# Patient Record
Sex: FEMALE | Race: WHITE | NOT HISPANIC OR LATINO | Employment: FULL TIME | URBAN - METROPOLITAN AREA
[De-identification: names, ages, dates, MRNs, and addresses within clinical notes are randomized per-mention and may not be internally consistent; named-entity substitution may affect disease eponyms.]

---

## 2019-01-02 ENCOUNTER — TRANSCRIBE ORDERS (OUTPATIENT)
Dept: ADMINISTRATIVE | Facility: HOSPITAL | Age: 49
End: 2019-01-02

## 2019-01-02 DIAGNOSIS — N93.9 VAGINA BLEEDING: Primary | ICD-10-CM

## 2021-06-09 ENCOUNTER — APPOINTMENT (EMERGENCY)
Dept: RADIOLOGY | Facility: HOSPITAL | Age: 51
End: 2021-06-09
Payer: COMMERCIAL

## 2021-06-09 ENCOUNTER — HOSPITAL ENCOUNTER (EMERGENCY)
Facility: HOSPITAL | Age: 51
Discharge: HOME | End: 2021-06-09
Attending: EMERGENCY MEDICINE | Admitting: EMERGENCY MEDICINE
Payer: COMMERCIAL

## 2021-06-09 VITALS
RESPIRATION RATE: 20 BRPM | WEIGHT: 168 LBS | HEIGHT: 62 IN | BODY MASS INDEX: 30.91 KG/M2 | DIASTOLIC BLOOD PRESSURE: 70 MMHG | HEART RATE: 90 BPM | OXYGEN SATURATION: 97 % | SYSTOLIC BLOOD PRESSURE: 146 MMHG

## 2021-06-09 DIAGNOSIS — M79.641 RIGHT HAND PAIN: Primary | ICD-10-CM
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
## 2021-06-09 PROCEDURE — 73130 X-RAY EXAM OF HAND: CPT

## 2021-06-09 PROCEDURE — 99284 EMERGENCY DEPT VISIT MOD MDM: CPT | Performed by: EMERGENCY MEDICINE

## 2021-06-09 PROCEDURE — 99283 EMERGENCY DEPT VISIT LOW MDM: CPT

## 2021-06-09 RX ORDER — IBUPROFEN 600 MG/1
600 TABLET ORAL ONCE
Status: COMPLETED | OUTPATIENT
Start: 2021-06-09 | End: 2021-06-09

## 2021-06-09 RX ORDER — ACETAMINOPHEN 325 MG/1
650 TABLET ORAL ONCE
Status: COMPLETED | OUTPATIENT
Start: 2021-06-09 | End: 2021-06-09

## 2021-06-09 RX ADMIN — ACETAMINOPHEN 650 MG: 325 TABLET, FILM COATED ORAL at 07:27

## 2021-06-09 RX ADMIN — IBUPROFEN 600 MG: 600 TABLET, FILM COATED ORAL at 07:27

## 2021-06-09 NOTE — DISCHARGE INSTRUCTIONS
Take tylenol, motrin, rest, ice, elevate  Keep hand ace wrapped for support  Follow up with hand surgery for reassessment  Return to the emergency department for the following, but not limited to worsening pain, numbness, tingling, weakness, redness/rash, red streaks, fevers, swelling

## 2021-06-09 NOTE — Clinical Note
Terrell Lebron was seen and treated in our emergency department on 6/9/2021  Diagnosis:     Azalia Damon  may return to work on return date  She may return on this date: 06/14/2021         If you have any questions or concerns, please don't hesitate to call        Terrance Maciel DO    ______________________________           _______________          _______________  Hospital Representative                              Date                                Time

## 2021-06-09 NOTE — ED PROVIDER NOTES
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  She also hit the back of her hand on something that day  Tuesday, her hand was a bit achey, but then today, she complains of worsening pain, constant, throbbing, radiating from the fingers into the hand and sometimes into the forearm, with a bit of swelling in the hand and fingers  Denies any rash, numbness, tingling, weakness  Has not tried any medications at home for relief  None       Past Medical History:   Diagnosis Date    Disease of thyroid gland     Pancreatitis        Past Surgical History:   Procedure Laterality Date    CHOLECYSTECTOMY         History reviewed  No pertinent family history  I have reviewed and agree with the history as documented  E-Cigarette/Vaping     E-Cigarette/Vaping Substances     Social History     Tobacco Use    Smoking status: Never Smoker    Smokeless tobacco: Never Used   Substance Use Topics    Alcohol use: Not Currently    Drug use: Never       Review of Systems   Constitutional: Negative for chills and fever  Skin: Negative for color change, pallor and rash  Neurological: Negative for weakness and numbness  Physical Exam  Physical Exam  Vitals signs and nursing note reviewed  Constitutional:       General: She is not in acute distress  Appearance: Normal appearance  She is not ill-appearing, toxic-appearing or diaphoretic     HENT:      Head: Normocephalic and atraumatic  Mouth/Throat:      Mouth: Mucous membranes are moist    Eyes:      Conjunctiva/sclera: Conjunctivae normal       Pupils: Pupils are equal, round, and reactive to light  Neck:      Musculoskeletal: Neck supple  Cardiovascular:      Rate and Rhythm: Normal rate and regular rhythm  Pulses: Normal pulses  Heart sounds: Normal heart sounds  No murmur  Pulmonary:      Effort: Pulmonary effort is normal  No respiratory distress  Breath sounds: Normal breath sounds  No stridor  No wheezing, rhonchi or rales  Chest:      Chest wall: No tenderness  Abdominal:      General: Bowel sounds are normal  There is no distension  Palpations: Abdomen is soft  Tenderness: There is no abdominal tenderness  There is no guarding or rebound  Musculoskeletal:      Right wrist: Normal  She exhibits normal range of motion, no tenderness, no bony tenderness, no swelling, no effusion, no crepitus, no deformity and no laceration  Right forearm: Normal  She exhibits no tenderness, no bony tenderness, no swelling, no edema, no deformity and no laceration  Right hand: She exhibits tenderness and swelling  She exhibits normal range of motion, no bony tenderness, normal two-point discrimination, normal capillary refill, no deformity and no laceration  Normal sensation noted  Decreased sensation is not present in the ulnar distribution, is not present in the medial distribution and is not present in the radial distribution  Normal strength noted  She exhibits no finger abduction, no thumb/finger opposition and no wrist extension trouble  Hands:       Right lower leg: No edema  Left lower leg: No edema  Skin:     General: Skin is warm and dry  Capillary Refill: Capillary refill takes less than 2 seconds  Findings: No bruising, erythema or rash  Neurological:      General: No focal deficit present        Mental Status: She is alert and oriented to person, place, and time  Mental status is at baseline  Psychiatric:         Mood and Affect: Mood normal          Behavior: Behavior normal          Vital Signs  ED Triage Vitals   Temp Pulse Respirations Blood Pressure SpO2   -- 06/09/21 0714 06/09/21 0714 06/09/21 0714 06/09/21 0714    90 20 146/70 97 %      Temp src Heart Rate Source Patient Position - Orthostatic VS BP Location FiO2 (%)   -- -- -- -- --             Pain Score       06/09/21 0727       6           Vitals:    06/09/21 0714   BP: 146/70   Pulse: 90         Visual Acuity      ED Medications  Medications   ibuprofen (MOTRIN) tablet 600 mg (600 mg Oral Given 6/9/21 0727)   acetaminophen (TYLENOL) tablet 650 mg (650 mg Oral Given 6/9/21 0727)       Diagnostic Studies  Results Reviewed     None                 XR hand 3+ views RIGHT   ED Interpretation by Xander Augustin DO (06/09 8434)   No acute fractures as interpreted by me independently                  Procedures  Procedures         ED Course                                           MDM  Number of Diagnoses or Management Options  Overuse injury:   Right hand pain:   Diagnosis management comments: Assessment and Plan:   46year old F presenting with right hand and fingers pain consistent with overuse injury  Exam is benign  No erythema/rash to suggest infectious etiology  Patient is neurovascularly intact  Will get XR to rule out fracture in setting of trauma  Rest, ice, elevate, tylenol, motrin and ace wrap for support  Follow up hand surgery for reassessment  Disposition  Final diagnoses:   Right hand pain   Overuse injury     Time reflects when diagnosis was documented in both MDM as applicable and the Disposition within this note     Time User Action Codes Description Comment    6/9/2021  7:24 AM Goran Page Add [P82 947] Right hand pain     6/9/2021  7:24 AM Goran Page Add [X50  3XXA] Overuse injury       ED Disposition     ED Disposition Condition Date/Time Comment Discharge Stable Wed Jun 9, 2021  7:24 AM Katheryn Gallus Bickerstaff discharge to home/self care  Follow-up Information     Follow up With Specialties Details Why Contact Info Additional Andrés Adrian [address] Emergency Department Emergency Medicine Go to  As needed, If symptoms worsen, for re-evaluation [address] [place],[address] [address]  [address] [facility] Emergency Department, [facility] 18, Luige Jayesh 10    Oc Vazquez MD Orthopedic Surgery, Hand Surgery Schedule an appointment as soon as possible for a visit in 1 week for re-evaluation Celia Castro [address] [place] [address]  [phone]             There are no discharge medications for this patient  No discharge procedures on file      PDMP Review     None          ED Provider  Electronically Signed by           Goyo Warren DO  06/09/21 0800

## 2022-07-29 ENCOUNTER — APPOINTMENT (EMERGENCY)
Dept: RADIOLOGY | Facility: HOSPITAL | Age: 52
End: 2022-07-29
Payer: COMMERCIAL

## 2022-07-29 ENCOUNTER — HOSPITAL ENCOUNTER (EMERGENCY)
Facility: HOSPITAL | Age: 52
Discharge: HOME | End: 2022-07-29
Attending: EMERGENCY MEDICINE
Payer: COMMERCIAL

## 2022-07-29 VITALS
TEMPERATURE: 98.2 F | HEIGHT: 62 IN | DIASTOLIC BLOOD PRESSURE: 77 MMHG | WEIGHT: 155 LBS | HEART RATE: 94 BPM | RESPIRATION RATE: 16 BRPM | SYSTOLIC BLOOD PRESSURE: 162 MMHG | BODY MASS INDEX: 28.52 KG/M2 | OXYGEN SATURATION: 100 %

## 2022-07-29 DIAGNOSIS — S60.229A CONTUSION OF HAND: Primary | ICD-10-CM

## 2022-07-29 PROCEDURE — 99284 EMERGENCY DEPT VISIT MOD MDM: CPT | Performed by: EMERGENCY MEDICINE

## 2022-07-29 PROCEDURE — 73130 X-RAY EXAM OF HAND: CPT

## 2022-07-29 PROCEDURE — 99283 EMERGENCY DEPT VISIT LOW MDM: CPT

## 2022-07-29 RX ORDER — LEVOTHYROXINE SODIUM 0.12 MG/1
137.5 TABLET ORAL DAILY
COMMUNITY
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
## 2023-02-05 ENCOUNTER — HOSPITAL ENCOUNTER (OUTPATIENT)
Facility: HOSPITAL | Age: 53
Setting detail: OBSERVATION
Discharge: HOME | End: 2023-02-05
Attending: EMERGENCY MEDICINE | Admitting: HOSPITALIST

## 2023-02-05 ENCOUNTER — APPOINTMENT (EMERGENCY)
Dept: RADIOLOGY | Facility: HOSPITAL | Age: 53
End: 2023-02-05

## 2023-02-05 VITALS
TEMPERATURE: 97.6 F | OXYGEN SATURATION: 98 % | HEIGHT: 62 IN | BODY MASS INDEX: 28.44 KG/M2 | SYSTOLIC BLOOD PRESSURE: 140 MMHG | HEART RATE: 86 BPM | RESPIRATION RATE: 20 BRPM | WEIGHT: 154.54 LBS | DIASTOLIC BLOOD PRESSURE: 78 MMHG

## 2023-02-05 DIAGNOSIS — E87.6 HYPOKALEMIA: ICD-10-CM

## 2023-02-05 DIAGNOSIS — R55 SYNCOPE: Primary | ICD-10-CM

## 2023-02-05 DIAGNOSIS — R94.31 PROLONGED Q-T INTERVAL ON ECG: ICD-10-CM

## 2023-02-05 PROBLEM — E03.9 HYPOTHYROIDISM: Status: ACTIVE | Noted: 2023-02-05

## 2023-02-05 LAB
2HR DELTA HS TROPONIN: -1 NG/L
4HR DELTA HS TROPONIN: -1 NG/L
ALBUMIN SERPL BCP-MCNC: 4.1 G/DL (ref 3.5–5)
ALP SERPL-CCNC: 87 U/L (ref 46–116)
ALT SERPL W P-5'-P-CCNC: 38 U/L (ref 12–78)
ANION GAP SERPL CALCULATED.3IONS-SCNC: 16 MMOL/L (ref 4–13)
ANION GAP SERPL CALCULATED.3IONS-SCNC: 8 MMOL/L (ref 4–13)
AST SERPL W P-5'-P-CCNC: 60 U/L (ref 5–45)
ATRIAL RATE: 98 BPM
BASOPHILS # BLD AUTO: 0.05 THOUSANDS/ÂΜL (ref 0–0.1)
BASOPHILS NFR BLD AUTO: 0 % (ref 0–1)
BILIRUB SERPL-MCNC: 0.9 MG/DL (ref 0.2–1)
BUN SERPL-MCNC: 10 MG/DL (ref 5–25)
BUN SERPL-MCNC: 12 MG/DL (ref 5–25)
CALCIUM SERPL-MCNC: 8.8 MG/DL (ref 8.3–10.1)
CALCIUM SERPL-MCNC: 9.8 MG/DL (ref 8.3–10.1)
CARDIAC TROPONIN I PNL SERPL HS: 5 NG/L
CARDIAC TROPONIN I PNL SERPL HS: 5 NG/L
CARDIAC TROPONIN I PNL SERPL HS: 6 NG/L
CHLORIDE SERPL-SCNC: 103 MMOL/L (ref 96–108)
CHLORIDE SERPL-SCNC: 98 MMOL/L (ref 96–108)
CO2 SERPL-SCNC: 23 MMOL/L (ref 21–32)
CO2 SERPL-SCNC: 26 MMOL/L (ref 21–32)
CREAT SERPL-MCNC: 0.62 MG/DL (ref 0.6–1.3)
CREAT SERPL-MCNC: 0.81 MG/DL (ref 0.6–1.3)
EOSINOPHIL # BLD AUTO: 0.01 THOUSAND/ÂΜL (ref 0–0.61)
EOSINOPHIL NFR BLD AUTO: 0 % (ref 0–6)
ERYTHROCYTE [DISTWIDTH] IN BLOOD BY AUTOMATED COUNT: 12.5 % (ref 11.6–15.1)
GFR SERPL CREATININE-BSD FRML MDRD: 102 ML/MIN/1.73SQ M
GFR SERPL CREATININE-BSD FRML MDRD: 82 ML/MIN/1.73SQ M
GLUCOSE SERPL-MCNC: 117 MG/DL (ref 65–140)
GLUCOSE SERPL-MCNC: 122 MG/DL (ref 65–140)
HCT VFR BLD AUTO: 43.4 % (ref 34.8–46.1)
HGB BLD-MCNC: 14.8 G/DL (ref 11.5–15.4)
IMM GRANULOCYTES # BLD AUTO: 0.05 THOUSAND/UL (ref 0–0.2)
IMM GRANULOCYTES NFR BLD AUTO: 0 % (ref 0–2)
LYMPHOCYTES # BLD AUTO: 1.85 THOUSANDS/ÂΜL (ref 0.6–4.47)
LYMPHOCYTES NFR BLD AUTO: 16 % (ref 14–44)
MAGNESIUM SERPL-MCNC: 1.7 MG/DL (ref 1.6–2.6)
MCH RBC QN AUTO: 29.3 PG (ref 26.8–34.3)
MCHC RBC AUTO-ENTMCNC: 34.1 G/DL (ref 31.4–37.4)
MCV RBC AUTO: 86 FL (ref 82–98)
MONOCYTES # BLD AUTO: 0.74 THOUSAND/ÂΜL (ref 0.17–1.22)
MONOCYTES NFR BLD AUTO: 7 % (ref 4–12)
NEUTROPHILS # BLD AUTO: 8.59 THOUSANDS/ÂΜL (ref 1.85–7.62)
NEUTS SEG NFR BLD AUTO: 77 % (ref 43–75)
NRBC BLD AUTO-RTO: 0 /100 WBCS
P AXIS: 59 DEGREES
PLATELET # BLD AUTO: 324 THOUSANDS/UL (ref 149–390)
PMV BLD AUTO: 9.8 FL (ref 8.9–12.7)
POTASSIUM SERPL-SCNC: 3.2 MMOL/L (ref 3.5–5.3)
POTASSIUM SERPL-SCNC: 4.1 MMOL/L (ref 3.5–5.3)
POTASSIUM SERPL-SCNC: 5.6 MMOL/L (ref 3.5–5.3)
PR INTERVAL: 158 MS
PROT SERPL-MCNC: 8.7 G/DL (ref 6.4–8.4)
QRS AXIS: 17 DEGREES
QRSD INTERVAL: 76 MS
QT INTERVAL: 412 MS
QTC INTERVAL: 525 MS
RBC # BLD AUTO: 5.05 MILLION/UL (ref 3.81–5.12)
SODIUM SERPL-SCNC: 137 MMOL/L (ref 135–147)
SODIUM SERPL-SCNC: 137 MMOL/L (ref 135–147)
T WAVE AXIS: 60 DEGREES
TSH SERPL DL<=0.05 MIU/L-ACNC: 1.42 UIU/ML (ref 0.45–4.5)
VENTRICULAR RATE: 98 BPM
WBC # BLD AUTO: 11.29 THOUSAND/UL (ref 4.31–10.16)

## 2023-02-05 RX ORDER — MAGNESIUM SULFATE HEPTAHYDRATE 40 MG/ML
2 INJECTION, SOLUTION INTRAVENOUS ONCE
Status: COMPLETED | OUTPATIENT
Start: 2023-02-05 | End: 2023-02-05

## 2023-02-05 RX ORDER — ONDANSETRON 4 MG/1
4 TABLET, ORALLY DISINTEGRATING ORAL ONCE
Status: COMPLETED | OUTPATIENT
Start: 2023-02-05 | End: 2023-02-05

## 2023-02-05 RX ORDER — POTASSIUM CHLORIDE 14.9 MG/ML
20 INJECTION INTRAVENOUS ONCE
Status: COMPLETED | OUTPATIENT
Start: 2023-02-05 | End: 2023-02-05

## 2023-02-05 RX ORDER — ONDANSETRON 2 MG/ML
4 INJECTION INTRAMUSCULAR; INTRAVENOUS EVERY 6 HOURS PRN
Status: DISCONTINUED | OUTPATIENT
Start: 2023-02-05 | End: 2023-02-05

## 2023-02-05 RX ORDER — POTASSIUM CHLORIDE 20 MEQ/1
40 TABLET, EXTENDED RELEASE ORAL ONCE
Status: COMPLETED | OUTPATIENT
Start: 2023-02-05 | End: 2023-02-05

## 2023-02-05 RX ORDER — SODIUM CHLORIDE 9 MG/ML
100 INJECTION, SOLUTION INTRAVENOUS CONTINUOUS
Status: DISCONTINUED | OUTPATIENT
Start: 2023-02-05 | End: 2023-02-05 | Stop reason: HOSPADM

## 2023-02-05 RX ORDER — ACETAMINOPHEN 325 MG/1
650 TABLET ORAL EVERY 6 HOURS PRN
Status: DISCONTINUED | OUTPATIENT
Start: 2023-02-05 | End: 2023-02-05 | Stop reason: HOSPADM

## 2023-02-05 RX ADMIN — LEVOTHYROXINE SODIUM 137.5 MCG: 25 TABLET ORAL at 06:28

## 2023-02-05 RX ADMIN — POTASSIUM CHLORIDE 20 MEQ: 14.9 INJECTION, SOLUTION INTRAVENOUS at 01:54

## 2023-02-05 RX ADMIN — SODIUM CHLORIDE 1000 ML: 0.9 INJECTION, SOLUTION INTRAVENOUS at 00:58

## 2023-02-05 RX ADMIN — MAGNESIUM SULFATE HEPTAHYDRATE 2 G: 2 INJECTION, SOLUTION INTRAVENOUS at 01:54

## 2023-02-05 RX ADMIN — POTASSIUM CHLORIDE 40 MEQ: 1500 TABLET, EXTENDED RELEASE ORAL at 01:54

## 2023-02-05 RX ADMIN — SODIUM CHLORIDE 100 ML/HR: 0.9 INJECTION, SOLUTION INTRAVENOUS at 03:17

## 2023-02-05 RX ADMIN — ONDANSETRON 4 MG: 4 TABLET, ORALLY DISINTEGRATING ORAL at 00:07
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
## 2023-02-05 NOTE — ASSESSMENT & PLAN NOTE
· Potassium 3 2 on admission  · Received repletion while in the ER  · Continue to monitor and replete as needed
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
## 2023-02-05 NOTE — NURSING NOTE
Received pt from ER into ER Holding (IR room 1) at approx 0245  IV Potassium and IV Magnesium now completed and NSS infusing as ordered  Pt denies discomfort, states she feels "normal now"; ambulated to BR to void  VSS Dozing for short intervals  NSR on the monitor
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
## 2023-02-05 NOTE — DISCHARGE SUMMARY
[place]  Discharge- Jim Marrero 1970, 45 y o  female MRN: [id number]  Unit/Bed#: EB82A Encounter: [id number]  Primary Care Provider: Reyna Nunez MD   Date and time admitted to hospital: 2/5/2023 12:31 AM    Hypokalemia  Assessment & Plan  · Potassium 3 2 on admission  · Received repletion while in the ER  · resolved    Hypothyroidism  Assessment & Plan  · Continue levothyroxine    * Syncope  Assessment & Plan  · Patient had a syncopal event when attempting to get out of hot tub, per day patient was unconscious for possibly a minute  After regaining consciousness continued to feel lightheaded  Prior to syncopal event patient had had a couple of [address] ice teas and minimal food for dinner  · EKG: NSR, 98 bpm  525 QTc  · No prior cardiac history  · Likely related to alcohol, not eating, hot tub  Repeat EKG shows QTc reduced to 470 range with correction of hypokalemia  Hospital Course:     Jim Marrero is a 45 y o  female patient who originally presented to the hospital on   Admission Orders (From admission, onward)     Ordered        02/05/23 0136  Place in Observation  Once                     due to syncope  This was in the setting of patient not eating, drinking alcohol, and hot tub usage  Patient was noted to have prolonged QTc on initial ekg  Patient endorsed a syncopal episode as a child as well  However, her QTc markedly improved with treatment of hypokalemia and hypomagnesemia  Therefore less suspicion for any long qt syndrome at this time  Patient given instructions to follow up with cardiology as an outpatient if she desires  Please see above list of diagnoses and related plan for additional information       Physical Exam:    GEN: No acute distress, comfortable  HEEENT: No JVD, PERRLA, no scleral icterus  RESP: Lungs clear to auscultation bilaterally  CV: RRR, +s1/s2   ABD: SOFT NON TENDER, POSITIVE BOWEL SOUNDS, NO DISTENTION  PSYCH: CALM  NEURO: Mentation baseline, NO FOCAL DEFICITS  SKIN: NO RASH  EXTREM: NO EDEMA    CONSULTING PROVIDERS   IP CONSULT TO CARDIOLOGY  IP CONSULT TO CASE MANAGEMENT    PROCEDURES PERFORMED  * No surgery found *    RADIOLOGY RESULTS  XR chest portable    Result Date: 2/5/2023  Narrative: CHEST INDICATION:   Acute Resp Failure  COMPARISON:  8/20/2013 EXAM PERFORMED/VIEWS:  XR CHEST PORTABLE Single view FINDINGS: Cardiomediastinal silhouette appears unremarkable  The lungs are clear  No pneumothorax or pleural effusion  Osseous structures appear within normal limits for patient age  Impression: No acute cardiopulmonary disease  Findings are stable Workstation performed: [id number]       LABS  Results from last 7 days   Lab Units 02/05/23  0058   WBC Thousand/uL 11 29*   HEMOGLOBIN g/dL 14 8   HEMATOCRIT % 43 4   MCV fL 86   PLATELETS Thousands/uL 324     Results from last 7 days   Lab Units 02/05/23  1433 02/05/23  0807 02/05/23  0058   SODIUM mmol/L 137  --  137   POTASSIUM mmol/L 4 1 5 6* 3 2*   CHLORIDE mmol/L 103  --  98   CO2 mmol/L 26  --  23   BUN mg/dL 10  --  12   CREATININE mg/dL 0 62  --  0 81   CALCIUM mg/dL 8 8  --  9 8   ALBUMIN g/dL  --   --  4 1   TOTAL BILIRUBIN mg/dL  --   --  0 90   ALK PHOS U/L  --   --  87   ALT U/L  --   --  38   AST U/L  --   --  60*   EGFR ml/min/1 73sq m 102  --  82   GLUCOSE RANDOM mg/dL 117  --  122     Results from last 7 days   Lab Units 02/05/23  0807 02/05/23  0328 02/05/23  0058   HS TNI 0HR ng/L  --   --  6   HS TNI 2HR ng/L 5  --   --    HS TNI 4HR ng/L  --  5  --                       Results from last 7 days   Lab Units 02/05/23  0058   TSH 3RD GENERATON uIU/mL 1 417               Cultures:         Invalid input(s): URIBILINOGEN                Condition at Discharge:  good      Discharge instructions/Information to patient and family:   See after visit summary for information provided to patient and family        Provisions for Follow-Up Care:  See after visit summary for information related to follow-up care and any pertinent home health orders  Disposition:   HOME     Discharge Statement:  I spent 32 minutes discharging the patient  This time was spent on the day of discharge  I had direct contact with the patient on the day of discharge  Greater than 50% of the total time was spent examining patient, answering all patient questions, arranging and discussing plan of care with patient as well as directly providing post-discharge instructions  Additional time then spent on discharge activities  SISTER UPDATED    Discharge Medications:  See after visit summary for reconciled discharge medications provided to patient and family        ** Please Note: This note has been constructed using a voice recognition system **

## 2023-02-05 NOTE — Clinical Note
Case was discussed with EDUARDO and the patient's admission status was agreed to be Admission Status: observation status to the service of Dr Marilee May

## 2023-02-05 NOTE — ED PROVIDER NOTES
History  Chief Complaint   Patient presents with   • Syncope     59-year-old female with past medical history of hypothyroidism on levothyroxine presents for evaluation of syncopal event this evening, states that she was on a first-aid, had a couple of [address] ice teas and had very little amount for dinner was sitting in the hot tub and when she was getting out had a syncopal event where her date had to catch her, she was out for a few minutes and when she came to Feeling like she was about to pass out again  No chest pain or shortness of breath, no similar symptoms in the past   Currently feels nauseous but no chest pain or shortness of breath currently feels nauseous and had a vomiting episode in the ambulance  History of cardiac disease, cardiac arrhythmias, no history of diabetes, no history of DVT or PE          Prior to Admission Medications   Prescriptions Last Dose Informant Patient Reported? Taking?   levothyroxine 125 mcg tablet   Yes No   Sig: Take 137 5 mcg by mouth daily      Facility-Administered Medications: None       Past Medical History:   Diagnosis Date   • Disease of thyroid gland    • Hypothyroid    • Pancreatitis        Past Surgical History:   Procedure Laterality Date   •  SECTION     • CHOLECYSTECTOMY     • ECTOPIC PREGNANCY SURGERY         History reviewed  No pertinent family history  I have reviewed and agree with the history as documented  E-Cigarette/Vaping   • E-Cigarette Use Never User      E-Cigarette/Vaping Substances   • Nicotine No    • THC No    • CBD No    • Flavoring No    • Other No    • Unknown No      Social History     Tobacco Use   • Smoking status: Never   • Smokeless tobacco: Never   Vaping Use   • Vaping Use: Never used   Substance Use Topics   • Alcohol use: Yes     Comment: social   • Drug use: Yes     Types: Marijuana       Review of Systems   Constitutional: Negative for appetite change and fever  HENT: Negative for rhinorrhea and sore throat  Eyes: Negative for photophobia and visual disturbance  Respiratory: Negative for cough, chest tightness and wheezing  Cardiovascular: Negative for chest pain, palpitations and leg swelling  Gastrointestinal: Positive for vomiting  Negative for abdominal distention, abdominal pain, blood in stool, constipation and diarrhea  Genitourinary: Negative for dysuria, flank pain, frequency, hematuria and urgency  Musculoskeletal: Negative for back pain  Skin: Negative for rash  Neurological: Positive for syncope and light-headedness  Negative for dizziness, weakness and headaches  All other systems reviewed and are negative  Physical Exam  Physical Exam  Vitals and nursing note reviewed  Constitutional:       Appearance: She is well-developed  HENT:      Head: Normocephalic and atraumatic  Eyes:      Pupils: Pupils are equal, round, and reactive to light  Cardiovascular:      Rate and Rhythm: Normal rate and regular rhythm  Heart sounds: No murmur heard  No friction rub  No gallop  Pulmonary:      Effort: Pulmonary effort is normal       Breath sounds: No wheezing or rales  Chest:      Chest wall: No tenderness  Abdominal:      General: There is no distension  Palpations: Abdomen is soft  There is no mass  Tenderness: There is no guarding or rebound  Musculoskeletal:      Cervical back: Normal range of motion and neck supple  Skin:     General: Skin is warm and dry  Neurological:      Mental Status: She is alert and oriented to person, place, and time           Vital Signs  ED Triage Vitals [02/05/23 0004]   Temperature Pulse Respirations Blood Pressure SpO2   97 7 °F (36 5 °C) 102 18 112/69 98 %      Temp Source Heart Rate Source Patient Position - Orthostatic VS BP Location FiO2 (%)   Oral Monitor Sitting Left arm --      Pain Score       No Pain           Vitals:    02/05/23 0004   BP: 112/69   Pulse: 102   Patient Position - Orthostatic VS: Sitting Visual Acuity      ED Medications  Medications   sodium chloride 0 9 % bolus 1,000 mL (1,000 mL Intravenous New Bag 2/5/23 0058)   potassium chloride (K-DUR,KLOR-CON) CR tablet 40 mEq (has no administration in time range)   potassium chloride 20 mEq IVPB (premix) (has no administration in time range)   magnesium sulfate 2 g/50 mL IVPB (premix) 2 g (has no administration in time range)   ondansetron (ZOFRAN-ODT) dispersible tablet 4 mg (4 mg Oral Given 2/5/23 0007)       Diagnostic Studies  Results Reviewed     Procedure Component Value Units Date/Time    HS Troponin 0hr (reflex protocol) [[id number]]  (Normal) Collected: 02/05/23 0058    Lab Status: Final result Specimen: Blood from Arm, Right Updated: 02/05/23 0131     hs TnI 0hr 6 ng/L     HS Troponin I 2hr [[id number]]     Lab Status: No result Specimen: Blood     Comprehensive metabolic panel [[id number]]  (Abnormal) Collected: 02/05/23 0058    Lab Status: Final result Specimen: Blood from Arm, Right Updated: 02/05/23 0125     Sodium 137 mmol/L      Potassium 3 2 mmol/L      Chloride 98 mmol/L      CO2 23 mmol/L      ANION GAP 16 mmol/L      BUN 12 mg/dL      Creatinine 0 81 mg/dL      Glucose 122 mg/dL      Calcium 9 8 mg/dL      AST 60 U/L      ALT 38 U/L      Alkaline Phosphatase 87 U/L      Total Protein 8 7 g/dL      Albumin 4 1 g/dL      Total Bilirubin 0 90 mg/dL      eGFR 82 ml/min/1 73sq m     Narrative:      [place]ana guidelines for Chronic Kidney Disease (CKD):   •  Stage 1 with normal or high GFR (GFR > 90 mL/min/1 73 square meters)  •  Stage 2 Mild CKD (GFR = 60-89 mL/min/1 73 square meters)  •  Stage 3A Moderate CKD (GFR = 45-59 mL/min/1 73 square meters)  •  Stage 3B Moderate CKD (GFR = 30-44 mL/min/1 73 square meters)  •  Stage 4 Severe CKD (GFR = 15-29 mL/min/1 73 square meters)  •  Stage 5 End Stage CKD (GFR <15 mL/min/1 73 square meters)  Note: GFR calculation is accurate only with a steady state creatinine Magnesium [[id number]]  (Normal) Collected: 02/05/23 0058    Lab Status: Final result Specimen: Blood from Arm, Right Updated: 02/05/23 0125     Magnesium 1 7 mg/dL     CBC and differential [[id number]]  (Abnormal) Collected: 02/05/23 0058    Lab Status: Final result Specimen: Blood from Arm, Right Updated: 02/05/23 0106     WBC 11 29 Thousand/uL      RBC 5 05 Million/uL      Hemoglobin 14 8 g/dL      Hematocrit 43 4 %      MCV 86 fL      MCH 29 3 pg      MCHC 34 1 g/dL      RDW 12 5 %      MPV 9 8 fL      Platelets 723 Thousands/uL      nRBC 0 /100 WBCs      Neutrophils Relative 77 %      Immat GRANS % 0 %      Lymphocytes Relative 16 %      Monocytes Relative 7 %      Eosinophils Relative 0 %      Basophils Relative 0 %      Neutrophils Absolute 8 59 Thousands/µL      Immature Grans Absolute 0 05 Thousand/uL      Lymphocytes Absolute 1 85 Thousands/µL      Monocytes Absolute 0 74 Thousand/µL      Eosinophils Absolute 0 01 Thousand/µL      Basophils Absolute 0 05 Thousands/µL                  XR chest portable   ED Interpretation by Nancy Neal DO (02/05 0103)   This study was ordered and independently reviewed by me    No acute findings noted                    Procedures  CriticalCare Time  Performed by: Nancy Neal DO  Authorized by: Nancy Neal DO     Critical care provider statement:     Critical care time (minutes):  32    Critical care time was exclusive of:  Separately billable procedures and treating other patients and teaching time    Critical care was necessary to treat or prevent imminent or life-threatening deterioration of the following conditions: Syncope, Hypokalemia, Prolonged QTC      Critical care was time spent personally by me on the following activities:  Blood draw for specimens, obtaining history from patient or surrogate, development of treatment plan with patient or surrogate, discussions with primary provider, evaluation of patient's response to treatment, ordering and performing treatments and interventions, ordering and review of laboratory studies, ordering and review of radiographic studies and re-evaluation of patient's condition    I assumed direction of critical care for this patient from another provider in my specialty: no               ED Course  ED Course as of 02/05/23 0137   [facility] Feb 05, 2023   0039 Procedure Note: EKG  Date/Time: 02/05/23 12:39 AM   Performed by: Eddie Gatica  Authorized by: Eddie Gatica  Indications / Diagnosis: Syncope/Arrythmia  ECG reviewed by me, the ED Provider: yes   The EKG demonstrates:  Rhythm: normal sinus  Intervals: QTC prolonged, 525  Axis: normal axis  QRS/Blocks:normal QRS  ST Changes: No acute ST Changes, no STD/YOJANA        6763 Patient with prolonged QT on initial EKG, in setting of syncope concerning for cardiac cause of her syncopal episode   0128 And with hypokalemia and borderline hypomagnesemia in setting of prolonged QTC and syncope high risk for arrhythmia , case discussed with hospitalist, will admit for observation , telemetry monitoring                               SBIRT 20yo+    Flowsheet Row Most Recent Value   SBIRT (23 yo +)    In order to provide better care to our patients, we are screening all of our patients for alcohol and drug use  Would it be okay to ask you these screening questions? Yes Filed at: 02/05/2023 0009   Initial Alcohol Screen: US AUDIT-C     1  How often do you have a drink containing alcohol? 1 Filed at: 02/05/2023 0009   2  How many drinks containing alcohol do you have on a typical day you are drinking? 0 Filed at: 02/05/2023 0009   3b  FEMALE Any Age, or MALE 65+: How often do you have 4 or more drinks on one occassion? 0 Filed at: 02/05/2023 0009   Audit-C Score 1 Filed at: 02/05/2023 0009   PHILLIP: How many times in the past year have you    Used an illegal drug or used a prescription medication for non-medical reasons?  Never Filed at: 02/05/2023 0009                    Medical Decision Making  61-year-old female with syncopal event followed by a period of lightheadedness, similar history in the past   Differential diagnosis includes neurocardiogenic syncope, dehydration, arrhythmia, PE less likely as patient does not have any shortness of breath or chest pain, satting 98% on room air no DVT or PE risk factors  Obtain lab work and EKG will reevaluate after symptomatic    Previous records available in epic reviewed including previous office visits with PCP, no current medication changes    Hypokalemia: acute illness or injury  Prolonged Q-T interval on ECG: acute illness or injury  Syncope: acute illness or injury  Amount and/or Complexity of Data Reviewed  Labs: ordered  Radiology: ordered and independent interpretation performed  Risk  Prescription drug management  Decision regarding hospitalization  Disposition  Final diagnoses:   Syncope   Prolonged Q-T interval on ECG   Hypokalemia     Time reflects when diagnosis was documented in both MDM as applicable and the Disposition within this note     Time User Action Codes Description Comment    2/5/2023  1:17 AM Héctor Hickey [R55] Syncope     2/5/2023  1:17 AM Héctor Hickey [R94 31] Prolonged Q-T interval on ECG     2/5/2023  1:27 AM Héctor Hickey [E87 6] Hypokalemia       ED Disposition     ED Disposition   Admit    Condition   Stable    Date/Time   Sun Feb 5, 2023  1:35 AM    Comment   Case was discussed with EDUARDO and the patient's admission status was agreed to be Admission Status: observation status to the service of Dr Lisa Salvage   Follow-up Information    None         Patient's Medications   Discharge Prescriptions    No medications on file       No discharge procedures on file      PDMP Review     None          ED Provider  Electronically Signed by           Leodan Coulter DO  02/05/23 0085

## 2023-02-05 NOTE — H&P
[facility]  1970, 45 y o  female MRN: [id number]  Unit/Bed#: Lynnette Beltran Encounter: [id number]  Primary Care Provider: Pooja Madera MD   Date and time admitted to hospital: 2/5/2023 12:31 AM    * Syncope  Assessment & Plan  · Patient had a syncopal event when attempting to get out of hot tub, per day patient was unconscious for possibly a minute  After regaining consciousness continued to feel lightheaded  Prior to syncopal event patient had had a couple of [address] ice teas and minimal food for dinner  · EKG: NSR, 98 bpm  525 QTc  · No prior cardiac history  · Orthostatic Bps  · Telemetry  · Defer to cardiology if inpatient echo is warranted  · Cardiology consult    Hypokalemia  Assessment & Plan  · Potassium 3 2 on admission  · Received repletion while in the ER  · Continue to monitor and replete as needed    Hypothyroidism  Assessment & Plan  · Continue levothyroxine    VTE Pharmacologic Prophylaxis: VTE Score: 2 Low Risk (Score 0-2) - Encourage Ambulation  Code Status: Level 1 - Full Code   Discussion with family: Patient declined call to   Anticipated Length of Stay: Patient will be admitted on an observation basis with an anticipated length of stay of less than 2 midnights secondary to syncope  Total Time for Visit, including Counseling / Coordination of Care: 60 minutes Greater than 50% of this total time spent on direct patient counseling and coordination of care  Chief Complaint: passed out     History of Present Illness:  Gabriel Solis is a 45 y o  female with a PMH of hypothyroidism who presents to the ER post syncopal event  Per patient she was of her normal health throughout the day  She felt nervous/stressed about work and an upcoming date that she had night of 2/4  Due to this she had very little to eat throughout the day    Patient went on the date and had a couple of [address] ice teas at [employer] without any food intake  Her and her date then went to his house to get into the hot tub  They were in the hot tub for approximately 20 minutes  After exiting the hot tub patient had a sudden syncopal event  Denies having lightheadedness, dizziness or tunnel vision prior  Patient's state reports she was unconscious for approximately a minute  After regaining consciousness felt lightheaded and nauseous  Due to continued symptoms EMS was called  EKG with elevated QTc  Patient denies prior cardiac history  Reports having some syncopal episodes as a teenager but not during her adult life  Continues to feel nauseous but patient is attributing this to alcohol intake  Review of Systems:  Review of Systems   Constitutional: Negative for fatigue and fever  HENT: Negative for sore throat  Respiratory: Negative for cough, chest tightness and shortness of breath  Cardiovascular: Negative for chest pain  Gastrointestinal: Positive for nausea  Negative for abdominal distention, abdominal pain, diarrhea and vomiting  Genitourinary: Negative for difficulty urinating  Musculoskeletal: Negative for arthralgias  Neurological: Positive for syncope and light-headedness  Negative for weakness and headaches  Psychiatric/Behavioral: Negative for agitation and behavioral problems  All other systems reviewed and are negative  Past Medical and Surgical History:   Past Medical History:   Diagnosis Date   • Disease of thyroid gland    • Hypothyroid    • Pancreatitis        Past Surgical History:   Procedure Laterality Date   •  SECTION     • CHOLECYSTECTOMY     • ECTOPIC PREGNANCY SURGERY         Meds/Allergies:  Prior to Admission medications    Medication Sig Start Date End Date Taking? Authorizing Provider   levothyroxine 125 mcg tablet Take 137 5 mcg by mouth daily    Historical Provider, MD     I have reviewed home medications with patient personally      Allergies: No Known Allergies    Social History:  Marital Status:    Occupation: Unknown  Patient Pre-hospital Living Situation: Home  Patient Pre-hospital Level of Mobility: walks  Patient Pre-hospital Diet Restrictions: Regular  Substance Use History:   Social History     Substance and Sexual Activity   Alcohol Use Yes    Comment: social     Social History     Tobacco Use   Smoking Status Never   Smokeless Tobacco Never     Social History     Substance and Sexual Activity   Drug Use Yes   • Types: Marijuana       Family History:  History reviewed  No pertinent family history  Physical Exam:     Vitals:   Blood Pressure: 137/77 (02/05/23 0234)  Pulse: 94 (02/05/23 0234)  Temperature: 97 6 °F (36 4 °C) (02/05/23 0234)  Temp Source: Oral (02/05/23 0234)  Respirations: 18 (02/05/23 0234)  Height: 5' 2" (157 5 cm) (02/05/23 0242)  Weight - Scale: 70 1 kg (154 lb 8 7 oz) (02/05/23 0242)  SpO2: 97 % (02/05/23 0234)    Physical Exam  Vitals and nursing note reviewed  Constitutional:       Appearance: Normal appearance  HENT:      Head: Normocephalic  Eyes:      Extraocular Movements: Extraocular movements intact  Pupils: Pupils are equal, round, and reactive to light  Cardiovascular:      Rate and Rhythm: Normal rate and regular rhythm  Heart sounds: No murmur heard  Pulmonary:      Effort: Pulmonary effort is normal  No respiratory distress  Breath sounds: Normal breath sounds  No wheezing  Abdominal:      General: Bowel sounds are normal  There is no distension  Tenderness: There is no abdominal tenderness  There is no guarding  Musculoskeletal:         General: Normal range of motion  Cervical back: Normal range of motion  Skin:     General: Skin is warm  Neurological:      General: No focal deficit present  Mental Status: She is alert and oriented to person, place, and time  Psychiatric:         Mood and Affect: Mood normal          Behavior: Behavior normal          Thought Content:  Thought content normal           Additional Data:     Lab Results:  Results from last 7 days   Lab Units 02/05/23  0058   WBC Thousand/uL 11 29*   HEMOGLOBIN g/dL 14 8   HEMATOCRIT % 43 4   PLATELETS Thousands/uL 324   NEUTROS PCT % 77*   LYMPHS PCT % 16   MONOS PCT % 7   EOS PCT % 0     Results from last 7 days   Lab Units 02/05/23  0058   SODIUM mmol/L 137   POTASSIUM mmol/L 3 2*   CHLORIDE mmol/L 98   CO2 mmol/L 23   BUN mg/dL 12   CREATININE mg/dL 0 81   ANION GAP mmol/L 16*   CALCIUM mg/dL 9 8   ALBUMIN g/dL 4 1   TOTAL BILIRUBIN mg/dL 0 90   ALK PHOS U/L 87   ALT U/L 38   AST U/L 60*   GLUCOSE RANDOM mg/dL 122                       Lines/Drains:  Invasive Devices     Peripheral Intravenous Line  Duration           Peripheral IV 02/05/23 Right;Ventral (anterior) Forearm <1 day                    Imaging: Reviewed radiology reports from this admission including: chest xray  XR chest portable   ED Interpretation by Gera Mata DO (02/05 0103)   This study was ordered and independently reviewed by me    No acute findings noted               ** Please Note: This note has been constructed using a voice recognition system   **

## 2023-02-06 LAB
ATRIAL RATE: 76 BPM
P AXIS: 65 DEGREES
PR INTERVAL: 170 MS
QRS AXIS: 27 DEGREES
QRSD INTERVAL: 72 MS
QT INTERVAL: 422 MS
QTC INTERVAL: 474 MS
T WAVE AXIS: 52 DEGREES
VENTRICULAR RATE: 76 BPM

## 2023-05-03 ENCOUNTER — PREP FOR PROCEDURE (OUTPATIENT)
Dept: GASTROENTEROLOGY | Facility: CLINIC | Age: 53
End: 2023-05-03

## 2023-05-03 ENCOUNTER — TELEPHONE (OUTPATIENT)
Dept: GASTROENTEROLOGY | Facility: CLINIC | Age: 53
End: 2023-05-03

## 2023-05-03 DIAGNOSIS — Z12.11 SCREENING FOR COLON CANCER: Primary | ICD-10-CM

## 2023-05-03 NOTE — TELEPHONE ENCOUNTER
05/03/23  Screened by: Ekaterina Rodriguez    Referring Provider     Pre- Screening: There is no height or weight on file to calculate BMI  Has patient been referred for a routine screening Colonoscopy? yes  Is the patient between 39-70 years old? yes      Previous Colonoscopy no   If yes:    Date:     Facility:     Reason:       SCHEDULING STAFF: If the patient is between 45yrs-49yrs, please advise patient to confirm benefits/coverage with their insurance company for a routine screening colonoscopy, some insurance carriers will only cover at [address] or older  If the patient is over 66years old, please schedule an office visit  Does the patient want to see a Gastroenterologist prior to their procedure OR are they having any GI symptoms? no    Has the patient been hospitalized or had abdominal surgery in the past 6 months? no    Does the patient use supplemental oxygen? no    Does the patient take Coumadin, Lovenox, Plavix, Elliquis, Xarelto, or other blood thinning medication? no    Has the patient had a stroke, cardiac event, or stent placed in the past year? no    SCHEDULING STAFF: If patient answers NO to above questions, then schedule procedure  If patient answers YES to above questions, then schedule office appointment  Pt passed OA      If patient is between 45yrs - 49yrs, please advise patient that we will have to confirm benefits & coverage with their insurance company for a routine screening colonoscopy

## 2023-05-03 NOTE — TELEPHONE ENCOUNTER
"[facility] Assessment    Name: Jason Gamboa  YOB: 1970  Last Height: 5' 2\" (1 575 m)  Last weight: 70 1 kg (154 lb 8 7 oz)  BMI: 28 27 kg/m²  Procedure: colonoscopy  Diagnosis: screening  Date of procedure: 5/17/23  Prep:   Responsible  ? Phone#: ?  Name completing form: Lexy Servin  Date form completed: 05/03/23      If the patient answers yes to any of these questions, schedule in a hospital  Are you pregnant: No  Do you rely on a wheelchair for mobility: No  Have you been diagnosed with End Stage Renal Disease (ESRD): No  Do you need oxygen during the day: No  Have you had a heart attack or stroke within the past three months: No  Have you had a seizure within the past three months: No  Have you ever been informed by anesthesia that you have a difficult airway: No  Additional Questions  Have you had any cardiac testing or are under the care of a Cardiologist (see cardiac list): No  Cardiac list:   Do you have an implanted cardiac defibrillator: No (Comment:  This patient should be scheduled in the hospital)    Have any bleeding problems, such as anemia or hemophilia (If patient has H&H result below 8, schedule in hospital   H&H must be within 30 days of procedure): No    Had an organ transplant within the past 3 months: No    Do you have any present infections: No  Do you get short of breath when walking a few blocks: No  Have you been diagnosed with diabetes: No  Comments (provide cardiac provider information if applicable):        "

## 2023-05-03 NOTE — TELEPHONE ENCOUNTER
Scheduled date of colonoscopy (as of today):5/17/23    Physician performing colonoscopy:Dr Massey    Location of colonoscopy:[facility]    Clearances: N/A

## 2023-05-15 ENCOUNTER — NURSE TRIAGE (OUTPATIENT)
Dept: OTHER | Facility: OTHER | Age: 53
End: 2023-05-15

## 2023-05-15 NOTE — TELEPHONE ENCOUNTER
"  Reason for Disposition  • Caller has medicine question only, adult not sick, and triager answers question    Answer Assessment - Initial Assessment Questions  1  NAME of MEDICATION: \"What medicine are you calling about? \"      meds needed for colonoscopy  2  QUESTION: Jorge Carbone is your question? \" (e g , medication refill, side effect)      I know I am supposed to prep but I don't know what the prep is  3  PRESCRIBING HCP: \"Who prescribed it? \" Reason: if prescribed by specialist, call should be referred to that group  Dr Lupe Siddiqui  4  SYMPTOMS: \"Do you have any symptoms? \"      No symptoms  Colonoscopy on Wednesday 5/17/23  5  SEVERITY: If symptoms are present, ask \"Are they mild, moderate or severe? \"     No symptoms  6  PREGNANCY:  \"Is there any chance that you are pregnant? \" \"When was your last menstrual period? \"    N/a    Protocols used: MEDICATION QUESTION CALL-ADULT-[place]    "

## 2023-05-15 NOTE — TELEPHONE ENCOUNTER
Pt knows she needs to prep, doesn't know what meds she is supposed to take  Advised prep meds have been ordered for her at [facility]  Pt will go   Reviewed prep instructions with pt

## 2023-05-15 NOTE — TELEPHONE ENCOUNTER
"Regarding: colonoscopy on Wednesday/wants to know what she needs to take starting tomorrow  ----- Message from Samantha Louie sent at 5/15/2023  4:09 PM EDT -----  Pt called \"I am scheduled for a colonoscopy on Wednesday and I would like to know what I need to take for it  \"    "

## 2023-05-17 ENCOUNTER — HOSPITAL ENCOUNTER (OUTPATIENT)
Dept: GASTROENTEROLOGY | Facility: AMBULATORY SURGERY CENTER | Age: 53
Discharge: HOME | End: 2023-05-17
Attending: INTERNAL MEDICINE

## 2023-05-17 ENCOUNTER — ANESTHESIA EVENT (OUTPATIENT)
Dept: GASTROENTEROLOGY | Facility: AMBULATORY SURGERY CENTER | Age: 53
End: 2023-05-17

## 2023-05-17 ENCOUNTER — ANESTHESIA (OUTPATIENT)
Dept: GASTROENTEROLOGY | Facility: AMBULATORY SURGERY CENTER | Age: 53
End: 2023-05-17

## 2023-05-17 VITALS
BODY MASS INDEX: 28.52 KG/M2 | WEIGHT: 155 LBS | SYSTOLIC BLOOD PRESSURE: 158 MMHG | OXYGEN SATURATION: 98 % | TEMPERATURE: 97.7 F | HEART RATE: 81 BPM | RESPIRATION RATE: 20 BRPM | HEIGHT: 62 IN | DIASTOLIC BLOOD PRESSURE: 84 MMHG

## 2023-05-17 DIAGNOSIS — Z12.11 SCREENING FOR COLON CANCER: ICD-10-CM

## 2023-05-17 LAB
EXT PREGNANCY TEST URINE: NEGATIVE
EXT. CONTROL: NORMAL

## 2023-05-17 RX ORDER — SODIUM CHLORIDE, SODIUM LACTATE, POTASSIUM CHLORIDE, CALCIUM CHLORIDE 600; 310; 30; 20 MG/100ML; MG/100ML; MG/100ML; MG/100ML
50 INJECTION, SOLUTION INTRAVENOUS CONTINUOUS
Status: DISCONTINUED | OUTPATIENT
Start: 2023-05-17 | End: 2023-05-21 | Stop reason: HOSPADM

## 2023-05-17 RX ORDER — ATORVASTATIN CALCIUM 20 MG/1
20 TABLET, FILM COATED ORAL DAILY
COMMUNITY

## 2023-05-17 RX ORDER — DIPHENOXYLATE HYDROCHLORIDE AND ATROPINE SULFATE 2.5; .025 MG/1; MG/1
1 TABLET ORAL DAILY
COMMUNITY

## 2023-05-17 RX ORDER — PROPOFOL 10 MG/ML
INJECTION, EMULSION INTRAVENOUS AS NEEDED
Status: DISCONTINUED | OUTPATIENT
Start: 2023-05-17 | End: 2023-05-17

## 2023-05-17 RX ADMIN — PROPOFOL 130 MG: 10 INJECTION, EMULSION INTRAVENOUS at 09:23

## 2023-05-17 RX ADMIN — PROPOFOL 50 MG: 10 INJECTION, EMULSION INTRAVENOUS at 09:28

## 2023-05-17 RX ADMIN — SODIUM CHLORIDE, SODIUM LACTATE, POTASSIUM CHLORIDE, CALCIUM CHLORIDE 50 ML/HR: 600; 310; 30; 20 INJECTION, SOLUTION INTRAVENOUS at 09:13

## 2023-05-17 RX ADMIN — PROPOFOL 70 MG: 10 INJECTION, EMULSION INTRAVENOUS at 09:25

## 2023-05-17 RX ADMIN — PROPOFOL 70 MG: 10 INJECTION, EMULSION INTRAVENOUS at 09:36

## 2023-05-17 RX ADMIN — PROPOFOL 50 MG: 10 INJECTION, EMULSION INTRAVENOUS at 09:30

## 2023-05-17 NOTE — ANESTHESIA POSTPROCEDURE EVALUATION
Post-Op Assessment Note    CV Status:  Stable  Pain Score: 0    Pain management: adequate     Mental Status:  Alert and awake   Hydration Status:  Euvolemic   PONV Controlled:  Controlled   Airway Patency:  Patent      Post Op Vitals Reviewed: Yes      Staff: Anesthesiologist, CRNA         No notable events documented      BP  156/77   Temp      Pulse  82   Resp  18   1SpO2   99

## 2023-05-17 NOTE — H&P
"History and Physical -  [facility]  Delia Zapata 52 y o  female MRN: [id number]    HPI: Delia Zapata is a 52y o  year old female who presents for screening colonoscopy  This is her first colonoscopy  She denies any GI complaints at this time  Her sister  from colon cancer at age 54    REVIEW OF SYSTEMS: Per the HPI, and otherwise unremarkable  Historical Information   Past Medical History:   Diagnosis Date   • Disease of thyroid gland    • Hyperlipidemia    • Hypothyroid    • Pancreatitis      Past Surgical History:   Procedure Laterality Date   •  SECTION     • CHOLECYSTECTOMY     • ECTOPIC PREGNANCY SURGERY       Social History   Social History     Substance and Sexual Activity   Alcohol Use Yes    Comment: social     Social History     Substance and Sexual Activity   Drug Use Yes   • Types: Marijuana    Comment: pt states she last used marijuana 23     Social History     Tobacco Use   Smoking Status Never   Smokeless Tobacco Never     History reviewed  No pertinent family history  Meds/Allergies       Current Outpatient Medications:   •  atorvastatin (LIPITOR) 20 mg tablet  •  levothyroxine 125 mcg tablet  •  multivitamin (THERAGRAN) TABS  •  polyethylene glycol (GOLYTELY) 4000 mL solution    Current Facility-Administered Medications:   •  lactated ringers infusion, 50 mL/hr, Intravenous, Continuous    No Known Allergies    Objective     BP (!) 174/81   Pulse 77   Temp 97 7 °F (36 5 °C) (Temporal)   Resp (!) 25   Ht 5' 2\" (1 575 m)   Wt 70 3 kg (155 lb)   LMP 2023 (Approximate)   SpO2 95%   BMI 28 35 kg/m²     PHYSICAL EXAM    Gen: NAD AAOx3  Head: Normocephalic, Atraumatic  CV: S1S2 RRR no m/r/g  CHEST: Clear b/l no c/r/w  ABD: soft, +BS NT/ND  EXT: no edema    ASSESSMENT/PLAN:  This is a 52y o  year old female here for high risk CRC screening colonoscopy, and she is stable and optimized for her procedure        "

## 2023-05-17 NOTE — ANESTHESIA PREPROCEDURE EVALUATION
Procedure:  COLONOSCOPY    Relevant Problems   CARDIO   (+) Hyperlipidemia      ENDO   (+) Hypothyroidism        Physical Exam    Airway    Mallampati score: II  TM Distance: >3 FB  Neck ROM: full     Dental   No notable dental hx     Cardiovascular      Pulmonary      Other Findings        Anesthesia Plan  ASA Score- 2     Anesthesia Type- IV sedation with anesthesia with ASA Monitors  Additional Monitors:   Airway Plan:           Plan Factors-Exercise tolerance (METS): >4 METS  Chart reviewed  Patient summary reviewed  Induction- intravenous  Postoperative Plan-     Informed Consent- Anesthetic plan and risks discussed with patient  I personally reviewed this patient with the CRNA  Discussed and agreed on the Anesthesia Plan with the CRNA  Chin Judd

## 2023-08-22 ENCOUNTER — OFFICE VISIT (OUTPATIENT)
Dept: OBGYN CLINIC | Facility: CLINIC | Age: 53
End: 2023-08-22
Payer: COMMERCIAL

## 2023-08-22 VITALS
WEIGHT: 162 LBS | HEIGHT: 62 IN | BODY MASS INDEX: 29.81 KG/M2 | SYSTOLIC BLOOD PRESSURE: 180 MMHG | DIASTOLIC BLOOD PRESSURE: 110 MMHG

## 2023-08-22 DIAGNOSIS — N92.1 MENORRHAGIA WITH IRREGULAR CYCLE: Primary | ICD-10-CM

## 2023-08-22 DIAGNOSIS — Z12.4 ENCOUNTER FOR PAPANICOLAOU SMEAR FOR CERVICAL CANCER SCREENING: ICD-10-CM

## 2023-08-22 PROCEDURE — 58100 BIOPSY OF UTERUS LINING: CPT | Performed by: NURSE PRACTITIONER

## 2023-08-22 PROCEDURE — 99214 OFFICE O/P EST MOD 30 MIN: CPT | Performed by: NURSE PRACTITIONER

## 2023-08-22 RX ORDER — ICOSAPENT ETHYL 1000 MG/1
CAPSULE ORAL EVERY 12 HOURS
COMMUNITY

## 2023-08-22 RX ORDER — LEVOTHYROXINE SODIUM 137 UG/1
137 TABLET ORAL EVERY MORNING
COMMUNITY
Start: 2023-08-07

## 2023-08-22 RX ORDER — HYDROCODONE BITARTRATE AND ACETAMINOPHEN 5; 325 MG/1; MG/1
1 TABLET ORAL EVERY 6 HOURS PRN
COMMUNITY
Start: 2023-06-12

## 2023-08-22 RX ORDER — IBUPROFEN 600 MG/1
TABLET ORAL
COMMUNITY
Start: 2023-06-12

## 2023-08-22 RX ORDER — FLUTICASONE PROPIONATE 50 MCG
SPRAY, SUSPENSION (ML) NASAL EVERY 24 HOURS
COMMUNITY

## 2023-08-22 RX ORDER — PHENTERMINE HYDROCHLORIDE 37.5 MG/1
37.5 TABLET ORAL DAILY
COMMUNITY
Start: 2023-08-07

## 2023-08-22 RX ORDER — PHENTERMINE HYDROCHLORIDE 37.5 MG/1
CAPSULE ORAL EVERY 24 HOURS
COMMUNITY
Start: 2023-08-07

## 2023-08-22 RX ORDER — FERROUS SULFATE 325(65) MG
TABLET ORAL EVERY 24 HOURS
COMMUNITY

## 2023-08-22 NOTE — PATIENT INSTRUCTIONS
Menorrhagia with irreg periods. Possible causes include fibroids, polyps, normal menstrual changes, dysplasia/cancer.  Discussed observation, hormonal treatment, Lysteda, NSAIDS,  US to evaluate uterus, day 7- 10  +endometrial bx done today , CBC, TSH recent and normal

## 2023-08-22 NOTE — PROGRESS NOTES
Assessment/Plan:  Menorrhagia with irreg periods. Possible causes include fibroids, polyps, normal menstrual changes, dysplasia/cancer. Discussed observation, hormonal treatment, Lysteda, NSAIDS,  US to evaluate uterus, day 7- 10  +endometrial bx done today , CBC, TSH recent and normal        Diagnoses and all orders for this visit:    Menorrhagia with irregular cycle  -     US pelvis complete w transvaginal; Future  -     Pathology Report  -     Endometrial biopsy    Encounter for Papanicolaou smear for cervical cancer screening  -     IGP, Aptima HPV, Rfx 16/18,45    Other orders  -     phentermine (ADIPEX-P) 37.5 MG tablet; Take 37.5 mg by mouth daily  -     norethindrone (AYGESTIN) 5 mg tablet; Every 12 hours  -     levothyroxine 137 mcg tablet; Take 137 mcg by mouth every morning Take on an empty stomach  -     Icosapent Ethyl (Vascepa) 1 g CAPS; Every 12 hours  -     ibuprofen (MOTRIN) 600 mg tablet; TAKE ONE TABLET BY MOUTH EVERY 6 HOURS AS NEEDED WITH FOOD  -     HYDROcodone-acetaminophen (NORCO) 5-325 mg per tablet; Take 1 tablet by mouth every 6 (six) hours as needed  -     fluticasone (FLONASE) 50 mcg/act nasal spray; every 24 hours  -     ferrous sulfate 325 (65 Fe) mg tablet; every 24 hours  -     phentermine 37.5 MG capsule; every 24 hours          Subjective:      Patient ID: Dia Sebastian is a 52 y.o. female.      Last seen 2020 H/o menorrhagia and fibroids FH colon cancer had colonoscopy 2023 Last pap 2012 neg No HPV Periods are very irregular in frequency as well as duration LMP 2023 and lasted 2 weeks Denies abd or pelvic pain  Bowel andbladder are normal  Some hot flashes       The following portions of the patient's history were reviewed and updated as appropriate: allergies, current medications, past family history, past medical history, past social history, past surgical history and problem list.    Review of Systems   Constitutional: Negative for fatigue and unexpected weight change. Gastrointestinal: Negative for abdominal distention, abdominal pain, constipation and diarrhea. Genitourinary: Positive for menstrual problem and vaginal bleeding. Negative for difficulty urinating, dyspareunia, dysuria, frequency, genital sores, pelvic pain, urgency, vaginal discharge and vaginal pain. Neurological: Negative for headaches. Psychiatric/Behavioral: Negative. Negative for dysphoric mood. The patient is not nervous/anxious. Objective:      BP (!) 180/110 (BP Location: Left arm, Patient Position: Sitting, Cuff Size: Standard)   Ht 5' 2" (1.575 m)   Wt 73.5 kg (162 lb)   LMP 07/31/2023   BMI 29.63 kg/m²          Physical Exam  Vitals and nursing note reviewed. Constitutional:       General: She is not in acute distress. Appearance: Normal appearance. HENT:      Head: Normocephalic and atraumatic. Pulmonary:      Effort: Pulmonary effort is normal.   Abdominal:      General: There is no distension. Palpations: Abdomen is soft. There is no mass. Tenderness: There is no abdominal tenderness. Genitourinary:     General: Normal vulva. Exam position: Lithotomy position. Labia:         Right: No rash or lesion. Left: No rash or lesion. Vagina: Normal. No vaginal discharge or erythema. Cervix: No cervical motion tenderness, discharge, lesion or cervical bleeding. Uterus: Not enlarged and not tender. Adnexa:         Right: No mass or tenderness. Left: No mass or tenderness. Rectum: Normal.      Comments: PAP from cervix   Lymphadenopathy:      Lower Body: No right inguinal adenopathy. No left inguinal adenopathy. Skin:     General: Skin is warm and dry. Neurological:      General: No focal deficit present. Mental Status: She is alert and oriented to person, place, and time.    Psychiatric:         Mood and Affect: Mood normal.         Behavior: Behavior normal.         Thought Content: Thought content normal.       Endometrial biopsy    Date/Time: 8/22/2023 8:20 AM    Performed by: MANDIE Horta  Authorized by: MANDIE Horta  Universal Protocol:  Procedure performed by:  Consent: Verbal consent obtained. Risks and benefits: risks, benefits and alternatives were discussed  Consent given by: patient  Patient understanding: patient states understanding of the procedure being performed  Patient identity confirmed: verbally with patient      Indication:     Indications:  Other disorder of menstruation and other abnormal bleeding from female genital tract    Procedure:     Procedure: endometrial biopsy with Pipelle      A bivalve speculum was placed in the vagina: yes      Cervix cleaned and prepped: yes      Uterus sounded: yes      Uterus sound depth (cm):  7    Specimen collected: specimen collected and sent to pathology      Patient tolerated procedure well with no complications: yes    Comments:     Procedure comments:  Endometrial bx performed with ease 3 passes small amount of tissue

## 2023-08-24 LAB
PATH REPORT.SITE OF ORIGIN SPEC: NORMAL
PAYMENT PROCEDURE: NORMAL
SL AMB .: NORMAL

## 2023-08-26 LAB
CYTOLOGIST CVX/VAG CYTO: NORMAL
DX ICD CODE: NORMAL
HPV GENOTYPE REFLEX: NORMAL
HPV I/H RISK 4 DNA CVX QL PROBE+SIG AMP: NEGATIVE
OTHER STN SPEC: NORMAL
PATH REPORT.FINAL DX SPEC: NORMAL
SL AMB NOTE:: NORMAL
SL AMB SPECIMEN ADEQUACY: NORMAL
SL AMB TEST METHODOLOGY: NORMAL

## 2023-08-29 ENCOUNTER — TELEPHONE (OUTPATIENT)
Dept: OBGYN CLINIC | Facility: CLINIC | Age: 53
End: 2023-08-29

## 2023-08-29 NOTE — TELEPHONE ENCOUNTER
Tania topete/cristal she was in today and did not receive her prescription for u/s. Return call to [facility] printed for  in [address] office per her request.  Will be having done at [facility]. Adalberto Ceballos at  will initiate prior auth.

## 2023-09-13 ENCOUNTER — TELEPHONE (OUTPATIENT)
Dept: OBGYN CLINIC | Facility: CLINIC | Age: 53
End: 2023-09-13

## 2023-09-13 NOTE — TELEPHONE ENCOUNTER
Left message for pt US results unchanged from 2019 Lining looks like will be having a period soon Poss polyp vs submucosal fibroid unchanged Dr Pema Mccormick impression in 2019 was fibroid  These shrink after menopause Pt to call with questions  If she has bleeding longer than 10 days or heavy bleeding soaking in <1 hour for several hours Discussed Aygestin for prolonged menses or to cycle and give planned periods

## 2024-02-05 ENCOUNTER — TELEPHONE (OUTPATIENT)
Dept: OBGYN CLINIC | Facility: CLINIC | Age: 54
End: 2024-02-05

## 2024-02-05 NOTE — TELEPHONE ENCOUNTER
"Pt was seen 8/22/23 for menorrhagia and irregular bleeding.   Pt reports cycles have been irregular with irregular bleeding for the  past couple of years. Previously seen by Dr. Abdi . Pt saw Norma THAPA NP in 8/2023. An US was  done in September (see results from 9/13/23 TE- Poss polyp vs submucosal fibroid unchanged   Pt reports over the past 2 months she has been having irregular, heavy bleeding. States, bleeding has been \"on and off\" taking Aygestin as needed to stop the bleeding, Aygestin helps to slow and lesson flow  but resumes bleeding when she stops medication. Denies any pelvic pain.  Pt report this weekend she bled through a super plus tampon on to her pants and floor. Denies feeling symptomatic. She initiated Aygestin and bleeding has since slowed but would like to re-address management moving forward.   Pt transferred to reception to schedule appointment.     "

## 2024-02-05 NOTE — PROGRESS NOTES
"History and Physical  [facility]  [address], [place] [address]    Assessment/Plan:  1. Menorrhagia with irregular cycle  Assessment & Plan:  Tania states she has had bleeding heavy and irregular for years.  She has been using Aygestin \"off and on\" as prescribed by her PCP \"for a couple years\".  She has a h/o fibroids.  Most recently bleeding much heavier.    Review of ultrasound done in 8/2023 and compared to 2019 - fibroids unchanged but it appears she likely has an endometrial polyp, that was not noted 2019.  She did have a been biopsy of endometrium 8/2023 and appears the plan was expectant management.    Today I reviewed my interpretation of u/s shows endometrial polyp and known fibroids.  Endometrial polyp is likely worsening bleeding.   Reviewed that endometrial polyps can cause irregular bleeding sometimes, but are generally benign growths in the uterus.  The risk of malignancy is approximately 5% in postmenopausal women with bleeding abnormalities and less in premenopausal women without bleeding issues.  Generally we recommend removal of endometrial polyps to definitively confirm they are benign and to improve any bleeding issues.  The recommended procedure is hysteroscopy, D&C, polypectomy, which is performed in the hospital under IV sedation and allows us to visualize the cavity and remove the polyp.  Discussed R/B/A to this and expected recovery.  Tania is happy to proceed with this.  Given her age and increase in bleeding since last biopsy - I recommend repeat EMB today, she agrees.  I do not see need to repeat u/s prior to procedure as was only 6 months.    Discussed expected procedure, risks (including risk of bleeding, infection, damage to surrounding structure including but not limited to bladder, bowel, blood vessels and ureters), benefits, recovery and limitations.  All questions answered and consent signed.  Discussed will need PATs - EKG, CBC, and CMP.  PCP " "clearance is not necessary, unless abnormal EKG or requested by anesthesia.  Surgical scheduler will call patient to arrange.  Given amount of bleeding recently I would like to get CBC immediately to make sure  no acute anemia.  She states has labs from PCP and doing them tomorrow.  She will have sent to me.  To manage bleeding until procedure, I recommend taking Aygestin daily BID to slow and hopefully stop bleeding.  She should call me if bleeding doesn't stop in a week and we could consider increase in Aygestin or quick withdrawal to clear lining and then restart until procedure.  She agrees.      Orders:  -     Pathology Report  -     Endometrial biopsy            History and Physical    Subjective:   Jody L Bickerstaff is a 54 y.o.  female.      HPI:   Tania presents today to discuss her bleeding.  She states bleeding continues off and on, stops only shortly.  Complains heavy, clotty at times.  Occasional cramping but no pain.    She reports in last year periods more irregular.  Over last 8 weeks bleeding off and on and getting heavier overall.    Was seen in our office 2023 for bleeding and had EMB and u/s done:    - 2023 uterus 8.9 x 5.1 x 6.5cm, 2 fibroids 2.6cm subserosal, 1.7cm left pedunculated, EMS 13mm with 2.1 x 1.8cm ovoid vascular hypoechoic area polyp vs fibroid; ovaries normal.      - Reviewed 2019 u/s (also at [facility], seen in old records) - fibroids essentially same but EMS 8mm and homogeneous.       - 2023 EMB benign atrophic endometrium.    Has been on Aygestin from PCP for couple years.  Prescribed for twice a day.  She does not take it all the time but uses it \"as needed\" and starts and stops if bleeding.  Sometimes doesn't take for weeks or months.      No current contraception.  No chance pregnancy currently    Has labs from PCP planned tomorrow.      Gyn History  No LMP recorded (lmp unknown).       Last pap smear: 2023    She  reports being sexually active and has had " "partner(s) who are male.       OB History      Past Medical History:  No date: Disease of thyroid gland  No date: Hyperlipidemia  No date: Hypothyroid      Comment:  managed by PCP  2012: Pancreatitis      Comment:  resolved     Past Surgical History:  No date:  SECTION  No date: CHOLECYSTECTOMY  2012: ECTOPIC PREGNANCY SURGERY; Right      Comment:  right fallopian tube removed  No date: REDUCTION MAMMAPLASTY     Social History     Tobacco Use    Smoking status: Never     Passive exposure: Never    Smokeless tobacco: Never   Vaping Use    Vaping status: Never Used   Substance Use Topics    Alcohol use: Yes     Comment: social    Drug use: Yes     Types: Marijuana     Comment: pt states she last used marijuana 23          Current Outpatient Medications:     atorvastatin (LIPITOR) 20 mg tablet, Take 20 mg by mouth daily, Disp: , Rfl:     ferrous sulfate 325 (65 Fe) mg tablet, every 24 hours, Disp: , Rfl:     Icosapent Ethyl (Vascepa) 1 g CAPS, Every 12 hours, Disp: , Rfl:     levothyroxine 137 mcg tablet, Take 137 mcg by mouth every morning Take on an empty stomach, Disp: , Rfl:     multivitamin (THERAGRAN) TABS, Take 1 tablet by mouth daily, Disp: , Rfl:     norethindrone (AYGESTIN) 5 mg tablet, Every 12 hours, Disp: , Rfl:     phentermine 37.5 MG capsule, every 24 hours, Disp: , Rfl:     fluticasone (FLONASE) 50 mcg/act nasal spray, every 24 hours, Disp: , Rfl:     She has No Known Allergies..    ROS: Review of Systems   Constitutional: Negative.    Gastrointestinal: Negative.    Genitourinary:  Positive for menstrual problem and vaginal bleeding.   Psychiatric/Behavioral: Negative.         Objective:  /88 (BP Location: Right arm, Patient Position: Sitting, Cuff Size: Standard)   Ht 5' 2\" (1.575 m)   Wt 75.8 kg (167 lb)   LMP  (LMP Unknown)   BMI 30.54 kg/m²        Physical Exam  Constitutional:       Appearance: Normal appearance.   Cardiovascular:      Rate and Rhythm: Normal rate. "   Pulmonary:      Effort: Pulmonary effort is normal.      Breath sounds: Normal breath sounds.   Abdominal:      General: Bowel sounds are normal.      Palpations: Abdomen is soft.   Genitourinary:     General: Normal vulva.      Vagina: Bleeding present. No tenderness.      Cervix: Normal.      Uterus: Normal. Not enlarged and not tender.       Adnexa:         Right: No mass or tenderness.          Left: No mass or tenderness.     Musculoskeletal:      Right ankle: No swelling.      Left ankle: No swelling.   Neurological:      Mental Status: She is alert and oriented to person, place, and time.         Endometrial biopsy    Date/Time: 2/6/2024 4:00 PM    Performed by: Barbara Griffin MD  Authorized by: Barbara Griffin MD  Universal Protocol:  Consent: Verbal consent obtained.  Risks and benefits: risks, benefits and alternatives were discussed  Consent given by: patient  Patient understanding: patient states understanding of the procedure being performed  Patient identity confirmed: verbally with patient    Pre-procedure:     Premeds:  Ibuprofen  Procedure:     Procedure: endometrial biopsy with Pipelle      A bivalve speculum was placed in the vagina: yes      Cervix cleaned and prepped: yes      A paracervical block was performed: no      Uterus sounded: yes      Uterus sound depth (cm):  7    Specimen collected: specimen collected and sent to pathology      Patient tolerated procedure well with no complications: yes

## 2024-02-06 ENCOUNTER — OFFICE VISIT (OUTPATIENT)
Dept: OBGYN CLINIC | Facility: CLINIC | Age: 54
End: 2024-02-06
Payer: COMMERCIAL

## 2024-02-06 VITALS
HEIGHT: 62 IN | SYSTOLIC BLOOD PRESSURE: 146 MMHG | WEIGHT: 167 LBS | BODY MASS INDEX: 30.73 KG/M2 | DIASTOLIC BLOOD PRESSURE: 88 MMHG

## 2024-02-06 DIAGNOSIS — N92.1 MENORRHAGIA WITH IRREGULAR CYCLE: Primary | ICD-10-CM

## 2024-02-06 PROCEDURE — 58100 BIOPSY OF UTERUS LINING: CPT | Performed by: OBSTETRICS & GYNECOLOGY

## 2024-02-06 PROCEDURE — 99214 OFFICE O/P EST MOD 30 MIN: CPT | Performed by: OBSTETRICS & GYNECOLOGY

## 2024-02-06 NOTE — PATIENT INSTRUCTIONS
You will be contacted by Alyssa, our Surgical Scheduler to schedule your procedure/surgery.  She will also explain any preoperative labs, studies, or preop clearance necessary.    If your surgery is scheduled more than 30 days from your visit today, you may need another appointment prior to procedure, per hospital regulations.    If you are not seen again in the office prior to surgery:  You will be called the afternoon prior to your procedure to be informed of the time you need to arrive at the hospital and where to go.  You will be told which medications to take the morning of procedure and which to hold.  Please stop all herbal medications, aspirin, fish oil for 7 days prior to procedure.  Please avoid ibuprofen of 7 days prior unless absolutely necessary.  No eating or drinking after midnight the night before procedure.  If you have questions prior to the procedure, please call our office and ask to speak to the Surgical Scheduler.    IF YOU TEST POSITIVE FOR COVID 19 BETWEEN THIS VISIT AND YOUR SURGERY, PLEASE CALL OUR OFFICE.   - Per [facility] Guidelines, there is a waiting time from prior Covid 19 infection and undergoing anesthesia for elective procedures.

## 2024-02-09 LAB
PATH REPORT.SITE OF ORIGIN SPEC: NORMAL
PAYMENT PROCEDURE: NORMAL
SL AMB .: NORMAL

## 2024-02-16 ENCOUNTER — TELEPHONE (OUTPATIENT)
Dept: OBGYN CLINIC | Facility: CLINIC | Age: 54
End: 2024-02-16

## 2024-02-16 DIAGNOSIS — N92.1 MENORRHAGIA WITH IRREGULAR CYCLE: Primary | ICD-10-CM

## 2024-02-16 NOTE — ASSESSMENT & PLAN NOTE
"Tania states she has had bleeding heavy and irregular for years.  She has been using Aygestin \"off and on\" as prescribed by her PCP \"for a couple years\".  She has a h/o fibroids.  Most recently bleeding much heavier.    Review of ultrasound done in 8/2023 and compared to 2019 - fibroids unchanged but it appears she likely has an endometrial polyp, that was not noted 2019.  She did have a been biopsy of endometrium 8/2023 and appears the plan was expectant management.    Today I reviewed my interpretation of u/s shows endometrial polyp and known fibroids.  Endometrial polyp is likely worsening bleeding.   Reviewed that endometrial polyps can cause irregular bleeding sometimes, but are generally benign growths in the uterus.  The risk of malignancy is approximately 5% in postmenopausal women with bleeding abnormalities and less in premenopausal women without bleeding issues.  Generally we recommend removal of endometrial polyps to definitively confirm they are benign and to improve any bleeding issues.  The recommended procedure is hysteroscopy, D&C, polypectomy, which is performed in the hospital under IV sedation and allows us to visualize the cavity and remove the polyp.  Discussed R/B/A to this and expected recovery.  Tania is happy to proceed with this.  Given her age and increase in bleeding since last biopsy - I recommend repeat EMB today, she agrees.  I do not see need to repeat u/s prior to procedure as was only 6 months.    Discussed expected procedure, risks (including risk of bleeding, infection, damage to surrounding structure including but not limited to bladder, bowel, blood vessels and ureters), benefits, recovery and limitations.  All questions answered and consent signed.  Discussed will need PATs - EKG, CBC, and CMP.  PCP clearance is not necessary, unless abnormal EKG or requested by anesthesia.  Surgical scheduler will call patient to arrange.  Given amount of bleeding recently I would like to get " CBC immediately to make sure  no acute anemia.  She states has labs from PCP and doing them tomorrow.  She will have sent to me.  To manage bleeding until procedure, I recommend taking Aygestin daily BID to slow and hopefully stop bleeding.  She should call me if bleeding doesn't stop in a week and we could consider increase in Aygestin or quick withdrawal to clear lining and then restart until procedure.  She agrees.

## 2024-02-16 NOTE — TELEPHONE ENCOUNTER
Pt called informing she missed a call from Dr. Griffin regarding her test results.   Reviewed pathology report form 2/16/24  Informed patient her EMB weekly proliferative endometrium was benign.  Pt reports her bleeding had improved 2 days after starting the Aygestin  and currently has no further bleeding.   Pt just faxed a copy of current blood work results, pt is unsure if it included a CBC.   Checked solarity, awaiting results.

## 2024-02-19 ENCOUNTER — TELEPHONE (OUTPATIENT)
Dept: GYNECOLOGY | Facility: CLINIC | Age: 54
End: 2024-02-19

## 2024-02-21 ENCOUNTER — APPOINTMENT (OUTPATIENT)
Dept: LAB | Facility: HOSPITAL | Age: 54
End: 2024-02-21
Payer: COMMERCIAL

## 2024-02-21 DIAGNOSIS — N84.0 ENDOMETRIAL POLYP: ICD-10-CM

## 2024-02-21 DIAGNOSIS — Z01.818 OTHER SPECIFIED PRE-OPERATIVE EXAMINATION: ICD-10-CM

## 2024-02-21 DIAGNOSIS — N92.1 MENORRHAGIA WITH IRREGULAR CYCLE: ICD-10-CM

## 2024-02-21 LAB
ALBUMIN SERPL BCP-MCNC: 4.6 G/DL (ref 3.5–5)
ALP SERPL-CCNC: 50 U/L (ref 34–104)
ALT SERPL W P-5'-P-CCNC: 19 U/L (ref 7–52)
ANION GAP SERPL CALCULATED.3IONS-SCNC: 9 MMOL/L
AST SERPL W P-5'-P-CCNC: 21 U/L (ref 13–39)
BASOPHILS # BLD AUTO: 0.06 THOUSANDS/ÂΜL (ref 0–0.1)
BASOPHILS NFR BLD AUTO: 1 % (ref 0–1)
BILIRUB SERPL-MCNC: 0.47 MG/DL (ref 0.2–1)
BUN SERPL-MCNC: 12 MG/DL (ref 5–25)
CALCIUM SERPL-MCNC: 9.9 MG/DL (ref 8.4–10.2)
CHLORIDE SERPL-SCNC: 103 MMOL/L (ref 96–108)
CO2 SERPL-SCNC: 24 MMOL/L (ref 21–32)
CREAT SERPL-MCNC: 0.68 MG/DL (ref 0.6–1.3)
EOSINOPHIL # BLD AUTO: 0.06 THOUSAND/ÂΜL (ref 0–0.61)
EOSINOPHIL NFR BLD AUTO: 1 % (ref 0–6)
ERYTHROCYTE [DISTWIDTH] IN BLOOD BY AUTOMATED COUNT: 12.3 % (ref 11.6–15.1)
GFR SERPL CREATININE-BSD FRML MDRD: 98 ML/MIN/1.73SQ M
GLUCOSE SERPL-MCNC: 117 MG/DL (ref 65–140)
HCT VFR BLD AUTO: 43.5 % (ref 34.8–46.1)
HGB BLD-MCNC: 14 G/DL (ref 11.5–15.4)
IMM GRANULOCYTES # BLD AUTO: 0.03 THOUSAND/UL (ref 0–0.2)
IMM GRANULOCYTES NFR BLD AUTO: 0 % (ref 0–2)
LYMPHOCYTES # BLD AUTO: 2.79 THOUSANDS/ÂΜL (ref 0.6–4.47)
LYMPHOCYTES NFR BLD AUTO: 41 % (ref 14–44)
MCH RBC QN AUTO: 28.7 PG (ref 26.8–34.3)
MCHC RBC AUTO-ENTMCNC: 32.2 G/DL (ref 31.4–37.4)
MCV RBC AUTO: 89 FL (ref 82–98)
MONOCYTES # BLD AUTO: 0.88 THOUSAND/ÂΜL (ref 0.17–1.22)
MONOCYTES NFR BLD AUTO: 13 % (ref 4–12)
NEUTROPHILS # BLD AUTO: 2.96 THOUSANDS/ÂΜL (ref 1.85–7.62)
NEUTS SEG NFR BLD AUTO: 44 % (ref 43–75)
NRBC BLD AUTO-RTO: 0 /100 WBCS
PLATELET # BLD AUTO: 366 THOUSANDS/UL (ref 149–390)
PMV BLD AUTO: 10 FL (ref 8.9–12.7)
POTASSIUM SERPL-SCNC: 4.2 MMOL/L (ref 3.5–5.3)
PROT SERPL-MCNC: 8.2 G/DL (ref 6.4–8.4)
RBC # BLD AUTO: 4.88 MILLION/UL (ref 3.81–5.12)
SODIUM SERPL-SCNC: 136 MMOL/L (ref 135–147)
WBC # BLD AUTO: 6.78 THOUSAND/UL (ref 4.31–10.16)

## 2024-02-21 PROCEDURE — 85025 COMPLETE CBC W/AUTO DIFF WBC: CPT

## 2024-02-21 PROCEDURE — 36415 COLL VENOUS BLD VENIPUNCTURE: CPT

## 2024-02-21 PROCEDURE — 80053 COMPREHEN METABOLIC PANEL: CPT

## 2024-02-22 LAB
ATRIAL RATE: 105 BPM
P AXIS: 63 DEGREES
PR INTERVAL: 166 MS
QRS AXIS: 10 DEGREES
QRSD INTERVAL: 72 MS
QT INTERVAL: 346 MS
QTC INTERVAL: 457 MS
T WAVE AXIS: 50 DEGREES
VENTRICULAR RATE: 105 BPM

## 2024-03-06 ENCOUNTER — ANESTHESIA EVENT (OUTPATIENT)
Dept: PERIOP | Facility: HOSPITAL | Age: 54
End: 2024-03-06
Payer: COMMERCIAL

## 2024-03-06 NOTE — PRE-PROCEDURE INSTRUCTIONS
Pre-Surgery Instructions:   Medication Instructions    atorvastatin (LIPITOR) 20 mg tablet Take day of surgery.    ferrous sulfate 325 (65 Fe) mg tablet Stop taking 7 days prior to surgery.    levothyroxine 137 mcg tablet Take day of surgery.    multivitamin (THERAGRAN) TABS Stop taking 7 days prior to surgery.    norethindrone (AYGESTIN) 5 mg tablet Instructions provided by MD    phentermine 37.5 MG capsule Stop taking 5 days prior to surgery.   Medication instructions for day surgery reviewed. Please use only a sip of water to take your instructed medications. Avoid all over the counter vitamins, supplements and NSAIDS for one week prior to surgery per anesthesia guidelines. Tylenol is ok to take as needed.     You will receive a call one business day prior to surgery with an arrival time and hospital directions. If your surgery is scheduled on a Monday, the hospital will be calling you on the Friday prior to your surgery. If you have not heard from anyone by 8pm, please call the hospital supervisor through the hospital  at [phone]. ([facility] [phone] or [facility] [phone]).    Do not eat or drink anything after midnight the night before your surgery, including candy, mints, lifesavers, or chewing gum. Do not drink alcohol 24hrs before your surgery. Try not to smoke at least 24hrs before your surgery.       Follow the pre surgery showering instructions as listed in the “My Surgical Experience Booklet” or otherwise provided by your surgeon's office. Do not use a blade to shave the surgical area 1 week before surgery. It is okay to use a clean electric clippers up to 24 hours before surgery. Do not apply any lotions, creams, including makeup, cologne, deodorant, or perfumes after showering on the day of your surgery. Do not use dry shampoo, hair spray, hair gel, or any type of hair products.     No contact lenses, eye make-up, or artificial eyelashes. Remove nail polish, including gel polish, and  any artificial, gel, or acrylic nails if possible. Remove all jewelry including rings and body piercing jewelry.     Wear causal clothing that is easy to take on and off. Consider your type of surgery.    Keep any valuables, jewelry, piercings at home. Please bring any specially ordered equipment (sling, braces) if indicated.    Arrange for a responsible person to drive you to and from the hospital on the day of your surgery. Please confirm the visitor policy for the day of your procedure when you receive your phone call with an arrival time.     Call the surgeon's office with any new illnesses, exposures, or additional questions prior to surgery.    Please reference your “My Surgical Experience Booklet” for additional information to prepare for your upcoming surgery.

## 2024-03-11 PROCEDURE — NC001 PR NO CHARGE: Performed by: OBSTETRICS & GYNECOLOGY

## 2024-03-11 NOTE — H&P
"History and Physical  [facility] [place]  [address], [place] [address]     Assessment/Plan:  1. Menorrhagia with irregular cycle  Assessment & Plan:  Tania states she has had bleeding heavy and irregular for years.  She has been using Aygestin \"off and on\" as prescribed by her PCP \"for a couple years\".  She has a h/o fibroids.  Most recently bleeding much heavier.     Review of ultrasound done in 8/2023 and compared to 2019 - fibroids unchanged but it appears she likely has an endometrial polyp, that was not noted 2019.  She did have a been biopsy of endometrium 8/2023 and appears the plan was expectant management.     Today I reviewed my interpretation of u/s shows endometrial polyp and known fibroids.  Endometrial polyp is likely worsening bleeding.   Reviewed that endometrial polyps can cause irregular bleeding sometimes, but are generally benign growths in the uterus.  The risk of malignancy is approximately 5% in postmenopausal women with bleeding abnormalities and less in premenopausal women without bleeding issues.  Generally we recommend removal of endometrial polyps to definitively confirm they are benign and to improve any bleeding issues.  The recommended procedure is hysteroscopy, D&C, polypectomy, which is performed in the hospital under IV sedation and allows us to visualize the cavity and remove the polyp.  Discussed R/B/A to this and expected recovery.  Tania is happy to proceed with this.  Given her age and increase in bleeding since last biopsy - I recommend repeat EMB today, she agrees.  I do not see need to repeat u/s prior to procedure as was only 6 months.    Discussed expected procedure, risks (including risk of bleeding, infection, damage to surrounding structure including but not limited to bladder, bowel, blood vessels and ureters), benefits, recovery and limitations.  All questions answered and consent signed.  Discussed will need PATs - EKG, CBC, and CMP.  PCP " "clearance is not necessary, unless abnormal EKG or requested by anesthesia.  Surgical scheduler will call patient to arrange.  Given amount of bleeding recently I would like to get CBC immediately to make sure  no acute anemia.  She states has labs from PCP and doing them tomorrow.  She will have sent to me.  To manage bleeding until procedure, I recommend taking Aygestin daily BID to slow and hopefully stop bleeding.  She should call me if bleeding doesn't stop in a week and we could consider increase in Aygestin or quick withdrawal to clear lining and then restart until procedure.  She agrees.        Orders:  -     Pathology Report  -     Endometrial biopsy                 History and Physical     Subjective:   Jody L Bickerstaff is a 54 y.o.  female.        HPI:   Tania presents today to discuss her bleeding.  She states bleeding continues off and on, stops only shortly.  Complains heavy, clotty at times.  Occasional cramping but no pain.     She reports in last year periods more irregular.  Over last 8 weeks bleeding off and on and getting heavier overall.    Was seen in our office 2023 for bleeding and had EMB and u/s done:    - 2023 uterus 8.9 x 5.1 x 6.5cm, 2 fibroids 2.6cm subserosal, 1.7cm left pedunculated, EMS 13mm with 2.1 x 1.8cm ovoid vascular hypoechoic area polyp vs fibroid; ovaries normal.      - Reviewed 2019 u/s (also at [facility], seen in old records) - fibroids essentially same but EMS 8mm and homogeneous.       - 2023 EMB benign atrophic endometrium.     Has been on Aygestin from PCP for couple years.  Prescribed for twice a day.  She does not take it all the time but uses it \"as needed\" and starts and stops if bleeding.  Sometimes doesn't take for weeks or months.       No current contraception.  No chance pregnancy currently     Has labs from PCP planned tomorrow.        Gyn History  No LMP recorded (lmp unknown).     Last pap smear: 2023     She  reports being sexually active and " "has had partner(s) who are male.     OB History         Past Medical History   Past Medical History:  No date: Disease of thyroid gland  No date: Hyperlipidemia  No date: Hypothyroid      Comment:  managed by PCP  2012: Pancreatitis      Comment:  resolved           Past Surgical History   Past Surgical History:  No date:  SECTION  No date: CHOLECYSTECTOMY  2012: ECTOPIC PREGNANCY SURGERY; Right      Comment:  right fallopian tube removed  No date: REDUCTION MAMMAPLASTY         Social History            Tobacco Use    Smoking status: Never       Passive exposure: Never    Smokeless tobacco: Never   Vaping Use    Vaping status: Never Used   Substance Use Topics    Alcohol use: Yes       Comment: social    Drug use: Yes       Types: Marijuana       Comment: pt states she last used marijuana 23            Current Outpatient Medications:     atorvastatin (LIPITOR) 20 mg tablet, Take 20 mg by mouth daily, Disp: , Rfl:     ferrous sulfate 325 (65 Fe) mg tablet, every 24 hours, Disp: , Rfl:     Icosapent Ethyl (Vascepa) 1 g CAPS, Every 12 hours, Disp: , Rfl:     levothyroxine 137 mcg tablet, Take 137 mcg by mouth every morning Take on an empty stomach, Disp: , Rfl:     multivitamin (THERAGRAN) TABS, Take 1 tablet by mouth daily, Disp: , Rfl:     norethindrone (AYGESTIN) 5 mg tablet, Every 12 hours, Disp: , Rfl:     phentermine 37.5 MG capsule, every 24 hours, Disp: , Rfl:     fluticasone (FLONASE) 50 mcg/act nasal spray, every 24 hours, Disp: , Rfl:      She has No Known Allergies..     ROS: Review of Systems   Constitutional: Negative.    Gastrointestinal: Negative.    Genitourinary:  Positive for menstrual problem and vaginal bleeding.   Psychiatric/Behavioral: Negative.           Objective: (from 2024)  /88 (BP Location: Right arm, Patient Position: Sitting, Cuff Size: Standard)   Ht 5' 2\" (1.575 m)   Wt 75.8 kg (167 lb)   LMP  (LMP Unknown)   BMI 30.54 kg/m²          Physical " Exam  Constitutional:       Appearance: Normal appearance.   Cardiovascular:      Rate and Rhythm: Normal rate.   Pulmonary:      Effort: Pulmonary effort is normal.      Breath sounds: Normal breath sounds.   Abdominal:      General: Bowel sounds are normal.      Palpations: Abdomen is soft.   Genitourinary:     General: Normal vulva.      Vagina: Bleeding present. No tenderness.      Cervix: Normal.      Uterus: Normal. Not enlarged and not tender.       Adnexa:         Right: No mass or tenderness.          Left: No mass or tenderness.     Musculoskeletal:      Right ankle: No swelling.      Left ankle: No swelling.   Neurological:      Mental Status: She is alert and oriented to person, place, and time.          Endometrial biopsy     Date/Time: 2/6/2024 4:00 PM     Performed by: Barbara Griffin MD  Authorized by: Barbara Griffin MD  Universal Protocol:  Consent: Verbal consent obtained.  Risks and benefits: risks, benefits and alternatives were discussed  Consent given by: patient  Patient understanding: patient states understanding of the procedure being performed  Patient identity confirmed: verbally with patient     Pre-procedure:     Premeds:  Ibuprofen  Procedure:     Procedure: endometrial biopsy with [place]      A bivalve speculum was placed in the vagina: yes      Cervix cleaned and prepped: yes      A paracervical block was performed: no      Uterus sounded: yes      Uterus sound depth (cm):  7    Specimen collected: specimen collected and sent to pathology      Patient tolerated procedure well with no complications: yes

## 2024-03-14 ENCOUNTER — HOSPITAL ENCOUNTER (OUTPATIENT)
Facility: HOSPITAL | Age: 54
Setting detail: OUTPATIENT SURGERY
Discharge: HOME | End: 2024-03-14
Attending: OBSTETRICS & GYNECOLOGY | Admitting: OBSTETRICS & GYNECOLOGY
Payer: COMMERCIAL

## 2024-03-14 ENCOUNTER — ANESTHESIA (OUTPATIENT)
Dept: PERIOP | Facility: HOSPITAL | Age: 54
End: 2024-03-14
Payer: COMMERCIAL

## 2024-03-14 VITALS
SYSTOLIC BLOOD PRESSURE: 188 MMHG | TEMPERATURE: 97 F | WEIGHT: 167 LBS | RESPIRATION RATE: 20 BRPM | HEIGHT: 62 IN | HEART RATE: 78 BPM | BODY MASS INDEX: 30.73 KG/M2 | OXYGEN SATURATION: 98 % | DIASTOLIC BLOOD PRESSURE: 89 MMHG

## 2024-03-14 DIAGNOSIS — N92.1 MENORRHAGIA WITH IRREGULAR CYCLE: ICD-10-CM

## 2024-03-14 DIAGNOSIS — N84.0 ENDOMETRIAL POLYP: ICD-10-CM

## 2024-03-14 LAB
EXT PREGNANCY TEST URINE: NEGATIVE
EXT. CONTROL: NORMAL

## 2024-03-14 PROCEDURE — 88342 IMHCHEM/IMCYTCHM 1ST ANTB: CPT | Performed by: SPECIALIST

## 2024-03-14 PROCEDURE — 58558 HYSTEROSCOPY BIOPSY: CPT | Performed by: OBSTETRICS & GYNECOLOGY

## 2024-03-14 PROCEDURE — 88305 TISSUE EXAM BY PATHOLOGIST: CPT | Performed by: SPECIALIST

## 2024-03-14 PROCEDURE — 81025 URINE PREGNANCY TEST: CPT | Performed by: OBSTETRICS & GYNECOLOGY

## 2024-03-14 RX ORDER — MIDAZOLAM HYDROCHLORIDE 2 MG/2ML
INJECTION, SOLUTION INTRAMUSCULAR; INTRAVENOUS AS NEEDED
Status: DISCONTINUED | OUTPATIENT
Start: 2024-03-14 | End: 2024-03-14

## 2024-03-14 RX ORDER — ONDANSETRON 2 MG/ML
INJECTION INTRAMUSCULAR; INTRAVENOUS AS NEEDED
Status: DISCONTINUED | OUTPATIENT
Start: 2024-03-14 | End: 2024-03-14

## 2024-03-14 RX ORDER — LIDOCAINE HYDROCHLORIDE 10 MG/ML
INJECTION, SOLUTION EPIDURAL; INFILTRATION; INTRACAUDAL; PERINEURAL AS NEEDED
Status: DISCONTINUED | OUTPATIENT
Start: 2024-03-14 | End: 2024-03-14

## 2024-03-14 RX ORDER — PROPOFOL 10 MG/ML
INJECTION, EMULSION INTRAVENOUS AS NEEDED
Status: DISCONTINUED | OUTPATIENT
Start: 2024-03-14 | End: 2024-03-14

## 2024-03-14 RX ORDER — HYDROMORPHONE HCL IN WATER/PF 6 MG/30 ML
0.2 PATIENT CONTROLLED ANALGESIA SYRINGE INTRAVENOUS
Status: DISCONTINUED | OUTPATIENT
Start: 2024-03-14 | End: 2024-03-14 | Stop reason: HOSPADM

## 2024-03-14 RX ORDER — KETOROLAC TROMETHAMINE 30 MG/ML
INJECTION, SOLUTION INTRAMUSCULAR; INTRAVENOUS AS NEEDED
Status: DISCONTINUED | OUTPATIENT
Start: 2024-03-14 | End: 2024-03-14

## 2024-03-14 RX ORDER — ACETAMINOPHEN 325 MG/1
975 TABLET ORAL EVERY 6 HOURS PRN
Status: DISCONTINUED | OUTPATIENT
Start: 2024-03-14 | End: 2024-03-14 | Stop reason: HOSPADM

## 2024-03-14 RX ORDER — FENTANYL CITRATE 50 UG/ML
INJECTION, SOLUTION INTRAMUSCULAR; INTRAVENOUS AS NEEDED
Status: DISCONTINUED | OUTPATIENT
Start: 2024-03-14 | End: 2024-03-14

## 2024-03-14 RX ORDER — ONDANSETRON 2 MG/ML
4 INJECTION INTRAMUSCULAR; INTRAVENOUS ONCE AS NEEDED
Status: DISCONTINUED | OUTPATIENT
Start: 2024-03-14 | End: 2024-03-14 | Stop reason: HOSPADM

## 2024-03-14 RX ORDER — MAGNESIUM HYDROXIDE 1200 MG/15ML
LIQUID ORAL AS NEEDED
Status: DISCONTINUED | OUTPATIENT
Start: 2024-03-14 | End: 2024-03-14 | Stop reason: HOSPADM

## 2024-03-14 RX ORDER — PROPOFOL 10 MG/ML
INJECTION, EMULSION INTRAVENOUS CONTINUOUS PRN
Status: DISCONTINUED | OUTPATIENT
Start: 2024-03-14 | End: 2024-03-14

## 2024-03-14 RX ORDER — FENTANYL CITRATE/PF 50 MCG/ML
50 SYRINGE (ML) INJECTION
Status: DISCONTINUED | OUTPATIENT
Start: 2024-03-14 | End: 2024-03-14 | Stop reason: HOSPADM

## 2024-03-14 RX ORDER — LIDOCAINE HYDROCHLORIDE 10 MG/ML
INJECTION, SOLUTION EPIDURAL; INFILTRATION; INTRACAUDAL; PERINEURAL AS NEEDED
Status: DISCONTINUED | OUTPATIENT
Start: 2024-03-14 | End: 2024-03-14 | Stop reason: HOSPADM

## 2024-03-14 RX ORDER — SODIUM CHLORIDE, SODIUM LACTATE, POTASSIUM CHLORIDE, CALCIUM CHLORIDE 600; 310; 30; 20 MG/100ML; MG/100ML; MG/100ML; MG/100ML
INJECTION, SOLUTION INTRAVENOUS CONTINUOUS PRN
Status: DISCONTINUED | OUTPATIENT
Start: 2024-03-14 | End: 2024-03-14

## 2024-03-14 RX ADMIN — FENTANYL CITRATE 100 MCG: 50 INJECTION INTRAMUSCULAR; INTRAVENOUS at 11:45

## 2024-03-14 RX ADMIN — SODIUM CHLORIDE, SODIUM LACTATE, POTASSIUM CHLORIDE, AND CALCIUM CHLORIDE: .6; .31; .03; .02 INJECTION, SOLUTION INTRAVENOUS at 11:03

## 2024-03-14 RX ADMIN — PROPOFOL 50 MG: 10 INJECTION, EMULSION INTRAVENOUS at 11:45

## 2024-03-14 RX ADMIN — MIDAZOLAM 2 MG: 1 INJECTION INTRAMUSCULAR; INTRAVENOUS at 11:32

## 2024-03-14 RX ADMIN — PROPOFOL 130 MCG/KG/MIN: 10 INJECTION, EMULSION INTRAVENOUS at 11:45

## 2024-03-14 RX ADMIN — LIDOCAINE HYDROCHLORIDE 50 MG: 10 INJECTION, SOLUTION EPIDURAL; INFILTRATION; INTRACAUDAL; PERINEURAL at 11:45

## 2024-03-14 RX ADMIN — KETOROLAC TROMETHAMINE 15 MG: 30 INJECTION, SOLUTION INTRAMUSCULAR; INTRAVENOUS at 12:11

## 2024-03-14 RX ADMIN — ONDANSETRON 4 MG: 2 INJECTION INTRAMUSCULAR; INTRAVENOUS at 11:49

## 2024-03-14 NOTE — ANESTHESIA PREPROCEDURE EVALUATION
Procedure:  DILATATION AND CURETTAGE (D&C) WITH HYSTEROSCOPY,  POSSIBLE POLYPECTOMY, EUA (Uterus)    Relevant Problems   CARDIO   (+) Hyperlipidemia      ENDO   (+) Hypothyroidism      Obstetrics/Gynecology   (+) Menorrhagia with irregular cycle      Neurology/Sleep   (+) Syncope      2/21/24 Sinus tachycardia  Possible Left atrial enlargement  Cannot rule out Anterior infarct , age undetermined  Abnormal ECG  No previous ECGs available  Physical Exam    Airway    Mallampati score: II  TM Distance: >3 FB  Neck ROM: full     Dental   No notable dental hx     Cardiovascular      Pulmonary      Other Findings  post-pubertal.      Anesthesia Plan  ASA Score- 2     Anesthesia Type- IV sedation with anesthesia with ASA Monitors.         Additional Monitors:     Airway Plan:            Plan Factors-Exercise tolerance (METS): >4 METS.    Chart reviewed. EKG reviewed.  Existing labs reviewed. Patient summary reviewed.    Patient is not a current smoker.              Induction- intravenous.    Postoperative Plan-     Informed Consent- Anesthetic plan and risks discussed with patient.  I personally reviewed this patient with the CRNA. Discussed and agreed on the Anesthesia Plan with the CRNA..             Latest Reference Range & Units 02/21/24 15:06   WBC 4.31 - 10.16 Thousand/uL 6.78   RBC 3.81 - 5.12 Million/uL 4.88   Hemoglobin 11.5 - 15.4 g/dL 14.0   Hematocrit 34.8 - 46.1 % 43.5   MCV 82 - 98 fL 89   MCH 26.8 - 34.3 pg 28.7   MCHC 31.4 - 37.4 g/dL 32.2   RDW 11.6 - 15.1 % 12.3   Platelet Count 149 - 390 Thousands/uL 366

## 2024-03-14 NOTE — INTERVAL H&P NOTE
H&P reviewed. After examining the patient I find no changes in the patients condition since the H&P had been written.  Patient did take Aygestin 2x/day after last appt and bleeding stopped.  Small spotting today.  No other changes.    Vitals:    03/14/24 1102   BP: (!) 182/87   Pulse: 95   Resp: 18   Temp: 99.6 °F (37.6 °C)   SpO2: 94%

## 2024-03-14 NOTE — OP NOTE
OPERATIVE REPORT  PATIENT NAME: Jody L Bickerstaff    :  1970  MRN: [id number]  Pt Location: [facility] OR ROOM 04    SURGERY DATE: 3/14/2024    Surgeons and Role:     * Barbara Griffin MD - Primary    Preop Diagnosis:  Menorrhagia with irregular cycle [N92.1]  Endometrial polyp [N84.0]    Post-Op Diagnosis Codes:     * Menorrhagia with irregular cycle [N92.1]     * Endometrial polyp [N84.0]    Procedure(s):  DILATATION AND CURETTAGE (D&C) WITH HYSTEROSCOPY. POLYPECTOMY. EUA    Specimen(s):  ID Type Source Tests Collected by Time Destination   1 : EMC and polyp Tissue Endometrium TISSUE EXAM Barbara Griffin MD 3/14/2024 1206        Estimated Blood Loss:   Minimal    Drains:  * No LDAs found *    Anesthesia Type:   IV Sedation with Anesthesia    Operative Indications:  Menorrhagia with irregular cycle [N92.1]  Endometrial polyp [N84.0]    Operative Findings:  Polyp filling cavity, once removed cavity clear and os seen    Complications:   None    Procedure and Technique:  The patient was taken to the operating room where she was administered IV sedation by anesthesia. She was prepped and draped in the usual sterile fashion in the dorsal lithotomy position. A speculum was placed into the vagina to allow for good visualization of the cervix. A single-toothed tenaculum was then placed in the anterior lip of the cervix. 10cc of 1% lidocaine was used at 4:00 and 8:00 as a pericervical block.  The uterus was sounded to a depth of 9cm and then dilated to accommodate a #21 [place] heather dilator.    A 5mm diameter 30 degree angled Symphion hysteroscope was then inserted into the endocervical canal and crystalloid solution distending medium was turned on. The hysteroscope was advanced to the fundus and the diagnostic findings were as noted above. The resection device for the Symphion Tissues Removal System was placed into the operating channel.  The device was activated to morcellate and remove the polyp tissue.  The  hysteroscope was then removed and a thorough curettage of the endometrial canal was performed.  Upon completion, the single-toothed tenaculum was removed from the anterior lip of the cervix and good hemostasis was noted. All instruments were removed from the vagina.   Instrument, needle and sponge counts were correct x 2.  Fluid deficit at the end of the procedure was noted to be 200cc.    The patient tolerated the procedure well and was taken to the recovery room in stable condition.  Patient's sister was called and findings reviewed.   I was present for the entire procedure.    Patient Disposition:  PACU         SIGNATURE: Barbara Griffin MD  DATE: March 14, 2024  TIME: 12:14 PM

## 2024-03-14 NOTE — DISCHARGE INSTR - AVS FIRST PAGE
Please relax for the next 12/24 hours.  It is okay to shower.  You may take Motrin (ibuprofen) 600mg every 6 hours for pain or discomfort.  You may take Tylenol (acetaminophen) 650mg every 6 hours, in addition to the Motrin if needed.   Light bleeding is to be expected, call office if bleeding heavier than a period.  Can last up to a week or maybe more.  No baths/swimming, tampons or intercourse until bleeding completely stops.  Please call office if you develop fever > 100.5, chills, severe abdominal pain not improved with medications above, vomiting, or heavy bleeding (heavier than a period)

## 2024-03-14 NOTE — ANESTHESIA POSTPROCEDURE EVALUATION
Post-Op Assessment Note    CV Status:  Stable  Pain Score: 0    Pain management: adequate       Mental Status:  Sleepy and awake   Hydration Status:  Stable   PONV Controlled:  None   Airway Patency:  Patent     Post Op Vitals Reviewed: Yes    No anethesia notable event occurred.    Staff: Anesthesiologist, CRNA               BP   156/95   Temp      Pulse  77   Resp   14   SpO2   99

## 2024-03-20 PROCEDURE — 88342 IMHCHEM/IMCYTCHM 1ST ANTB: CPT | Performed by: SPECIALIST

## 2024-03-20 PROCEDURE — 88305 TISSUE EXAM BY PATHOLOGIST: CPT | Performed by: SPECIALIST

## (undated) DEVICE — CHLORHEXIDINE 4PCT 4 OZ

## (undated) DEVICE — NEEDLE SPINAL 22G X 3.5IN  QUINCKE

## (undated) DEVICE — TISSUE REMOVAL SYSTEM FLUID MANAGEMENT ACCESSORIES: Brand: SYMPHION

## (undated) DEVICE — MAXI PAD5.51 X 13.78 IN. (14.0 X 35.0 CM)HEAVYCONTOUREDUNSCENTED: Brand: CURITY

## (undated) DEVICE — TISSUE REMOVAL SYSTEM RESECTING DEVICE: Brand: SYMPHION

## (undated) DEVICE — ARTHROSCOPY FLOOR MAT

## (undated) DEVICE — PREMIUM DRY TRAY LF: Brand: MEDLINE INDUSTRIES, INC.

## (undated) DEVICE — GLOVE INDICATOR PI UNDERGLOVE SZ 7.5 BLUE

## (undated) DEVICE — STRL ALLENTOWN HYSTEROSCOPY PK: Brand: CARDINAL HEALTH

## (undated) DEVICE — SYRINGE 10ML LL CONTROL TOP

## (undated) DEVICE — TUBING SUCTION 5MM X 12 FT